# Patient Record
Sex: FEMALE | Race: WHITE | NOT HISPANIC OR LATINO | Employment: OTHER | URBAN - METROPOLITAN AREA
[De-identification: names, ages, dates, MRNs, and addresses within clinical notes are randomized per-mention and may not be internally consistent; named-entity substitution may affect disease eponyms.]

---

## 2019-08-28 ENCOUNTER — OFFICE VISIT (OUTPATIENT)
Dept: UROLOGY | Facility: MEDICAL CENTER | Age: 79
End: 2019-08-28
Payer: MEDICARE

## 2019-08-28 VITALS
DIASTOLIC BLOOD PRESSURE: 80 MMHG | HEART RATE: 85 BPM | SYSTOLIC BLOOD PRESSURE: 130 MMHG | BODY MASS INDEX: 34.16 KG/M2 | HEIGHT: 65 IN | WEIGHT: 205 LBS

## 2019-08-28 DIAGNOSIS — R35.0 FREQUENCY OF URINATION: Primary | ICD-10-CM

## 2019-08-28 LAB — POST-VOID RESIDUAL VOLUME, ML POC: 17 ML

## 2019-08-28 PROCEDURE — 99204 OFFICE O/P NEW MOD 45 MIN: CPT | Performed by: UROLOGY

## 2019-08-28 PROCEDURE — 51798 US URINE CAPACITY MEASURE: CPT | Performed by: UROLOGY

## 2019-08-28 RX ORDER — OLOPATADINE HYDROCHLORIDE 2 MG/ML
SOLUTION/ DROPS OPHTHALMIC
Refills: 3 | COMMUNITY
Start: 2019-06-07

## 2019-08-28 RX ORDER — SIMVASTATIN 10 MG
TABLET ORAL
COMMUNITY

## 2019-08-28 RX ORDER — ALPRAZOLAM 0.5 MG/1
TABLET ORAL
Refills: 2 | COMMUNITY
Start: 2019-08-18

## 2019-08-28 RX ORDER — FLUTICASONE PROPIONATE 50 MCG
SPRAY, SUSPENSION (ML) NASAL
COMMUNITY

## 2019-08-28 RX ORDER — NAPROXEN 375 MG/1
TABLET ORAL
COMMUNITY

## 2019-08-28 RX ORDER — MULTIVITAMIN
1 TABLET ORAL DAILY
COMMUNITY

## 2019-08-28 RX ORDER — BENAZEPRIL/HYDROCHLOROTHIAZIDE 20 MG-25MG
TABLET ORAL
COMMUNITY

## 2019-08-28 RX ORDER — BETAMETHASONE DIPROPIONATE 0.5 MG/G
CREAM TOPICAL
COMMUNITY

## 2019-08-28 RX ORDER — TRAMADOL HYDROCHLORIDE 50 MG/1
TABLET ORAL
COMMUNITY

## 2019-08-28 RX ORDER — VANCOMYCIN HYDROCHLORIDE 125 MG/1
CAPSULE ORAL
COMMUNITY

## 2019-08-28 RX ORDER — CEPHALEXIN 500 MG/1
CAPSULE ORAL
COMMUNITY

## 2019-08-28 RX ORDER — METOPROLOL TARTRATE 50 MG/1
50 TABLET, FILM COATED ORAL 2 TIMES DAILY
COMMUNITY

## 2019-08-28 RX ORDER — AZITHROMYCIN 250 MG/1
TABLET, FILM COATED ORAL
COMMUNITY

## 2019-08-28 RX ORDER — CLINDAMYCIN HYDROCHLORIDE 300 MG/1
CAPSULE ORAL
COMMUNITY

## 2019-08-28 RX ORDER — LANOLIN ALCOHOL/MO/W.PET/CERES
1 CREAM (GRAM) TOPICAL DAILY
COMMUNITY

## 2019-08-28 RX ORDER — LEVOTHYROXINE SODIUM 0.05 MG/1
50 TABLET ORAL DAILY
Refills: 1 | COMMUNITY
Start: 2019-07-21

## 2019-08-28 RX ORDER — IBUPROFEN 600 MG/1
TABLET ORAL
COMMUNITY
Start: 2018-12-22

## 2019-08-28 RX ORDER — PRAVASTATIN SODIUM 20 MG
TABLET ORAL
COMMUNITY

## 2019-08-28 RX ORDER — METOPROLOL SUCCINATE 50 MG/1
50 TABLET, EXTENDED RELEASE ORAL DAILY
Refills: 2 | COMMUNITY
Start: 2019-06-06

## 2019-08-28 RX ORDER — QUINAPRIL HCL AND HYDROCHLOROTHIAZIDE 20; 25 MG/1; MG/1
TABLET ORAL
COMMUNITY

## 2019-08-28 RX ORDER — BETAMETHASONE DIPROPIONATE 0.5 MG/G
CREAM TOPICAL
Refills: 1 | COMMUNITY
Start: 2019-06-05

## 2019-08-28 RX ORDER — LISINOPRIL AND HYDROCHLOROTHIAZIDE 25; 20 MG/1; MG/1
1 TABLET ORAL DAILY
Refills: 3 | COMMUNITY
Start: 2019-08-09

## 2019-08-28 RX ORDER — ROSUVASTATIN CALCIUM 5 MG/1
5 TABLET, COATED ORAL DAILY
Refills: 3 | COMMUNITY
Start: 2019-06-25

## 2019-08-28 RX ORDER — OMEPRAZOLE 20 MG/1
20 CAPSULE, DELAYED RELEASE ORAL 2 TIMES DAILY
Refills: 2 | COMMUNITY
Start: 2019-08-05

## 2019-08-28 RX ORDER — ASPIRIN 81 MG/1
81 TABLET, CHEWABLE ORAL DAILY
COMMUNITY

## 2019-08-28 RX ORDER — BRIMONIDINE TARTRATE/TIMOLOL 0.2%-0.5%
DROPS OPHTHALMIC (EYE)
Refills: 3 | COMMUNITY
Start: 2019-08-20

## 2019-08-28 RX ORDER — MELOXICAM 15 MG/1
15 TABLET ORAL DAILY PRN
Refills: 2 | COMMUNITY
Start: 2019-06-05

## 2019-08-28 NOTE — PATIENT INSTRUCTIONS
48 - 64  Oz of all fluids each day    If the frequency problem continues, call office and make appointment for "CYSTOSCOPY"

## 2019-09-04 NOTE — PROGRESS NOTES
HISTORY:    Recent, over the past couple weeks, development of urgency, frequency, and several nights of nocturia times 5-10  Has settled down over the past few days  Now he nocturia x2 or so  Daytime no urge incontinence, but frequency comes and goes  No dysuria, blood, signs of infection  History of SWL for stones many years ago, no symptoms of that recently  ASSESSMENT / PLAN:    Bladder emptying well now, PVR only 17 mL  No evidence of infection  Unclear cause of recent development of urgency frequency nocturia  However, getting better the past few days  She will observe her symptoms over the next few weeks  If this continues settle down, no treatment needed  If does not settle down, would consider Detrol 2 mg once per day to start, may need cystoscopy  She will let us know if symptoms warrant starting the med  The following portions of the patient's history were reviewed and updated as appropriate: allergies, current medications, past family history, past medical history, past social history, past surgical history and problem list     Review of Systems   All other systems reviewed and are negative  Objective:     Physical Exam   Constitutional: She is oriented to person, place, and time  She appears well-developed and well-nourished  No distress  HENT:   Head: Normocephalic and atraumatic  Eyes: Conjunctivae are normal    Cardiovascular: Normal rate and regular rhythm  Pulmonary/Chest: Effort normal and breath sounds normal  No respiratory distress  She has no wheezes  Abdominal: Soft  Bowel sounds are normal  She exhibits no distension and no mass  There is no tenderness  Neurological: She is alert and oriented to person, place, and time  Skin: Skin is warm and dry  She is not diaphoretic           No results found for: PSA]  No results found for: BUN  No results found for: CREATININE  No components found for: CBC      There is no problem list on file for this patient  Diagnoses and all orders for this visit:    Frequency of urination  -     POCT Measure PVR  -     Urinalysis with microscopic    Other orders  -     aspirin 81 mg chewable tablet; Chew 81 mg daily  -     Multiple Vitamin (MULTIVITAMIN) tablet; Take 1 tablet by mouth daily  -     co-enzyme Q-10 30 MG capsule; Take 30 mg by mouth 3 (three) times a day           Patient ID: Eduard Drummond is a 66 y o  female        Current Outpatient Medications:     ALPRAZolam (XANAX) 0 5 mg tablet, TAKE 1/2-1 TABLET BY MOUTH EVERY 4-6 HOURS AS NEEDED, Disp: , Rfl: 2    aspirin 81 mg chewable tablet, Chew 81 mg daily, Disp: , Rfl:     calcium citrate-vitamin D (CITRACAL+D) 315-200 MG-UNIT per tablet, Take 1 tablet by mouth daily, Disp: , Rfl:     co-enzyme Q-10 30 MG capsule, Take 30 mg by mouth 3 (three) times a day, Disp: , Rfl:     COMBIGAN 0 2-0 5 %, INSTILL 1 DROP TO BOTH EYES TWICE DAILY, Disp: , Rfl: 3    denosumab (PROLIA) 60 mg/mL, Inject 60 mg under the skin, Disp: , Rfl:     glucosamine-chondroitin 500-400 MG tablet, Take 1 tablet by mouth daily, Disp: , Rfl:     levothyroxine 50 mcg tablet, Take 50 mcg by mouth daily, Disp: , Rfl: 1    lisinopril-hydrochlorothiazide (PRINZIDE,ZESTORETIC) 20-25 MG per tablet, Take 1 tablet by mouth daily, Disp: , Rfl: 3    meloxicam (MOBIC) 15 mg tablet, Take 15 mg by mouth daily as needed, Disp: , Rfl: 2    metoprolol succinate (TOPROL-XL) 50 mg 24 hr tablet, Take 50 mg by mouth daily, Disp: , Rfl: 2    Multiple Vitamin (MULTIVITAMIN) tablet, Take 1 tablet by mouth daily, Disp: , Rfl:     olopatadine HCl (PATADAY) 0 2 % opth drops, INSTILL 1 DROP TO BOTH EYES ONCE DAILY AS NEEDED, Disp: , Rfl: 3    omeprazole (PriLOSEC) 20 mg delayed release capsule, Take 20 mg by mouth 2 (two) times a day, Disp: , Rfl: 2    rosuvastatin (CRESTOR) 5 mg tablet, Take 5 mg by mouth daily, Disp: , Rfl: 3    azithromycin (ZITHROMAX) 250 mg tablet, azithromycin 250 mg tablet, Disp: , Rfl:     benazepril-hydrochlorthiazide (LOTENSIN HCT) 20-25 MG per tablet, benazepril 20 mg-hydrochlorothiazide 25 mg tablet, Disp: , Rfl:     betamethasone dipropionate (DIPROSONE) 0 05 % cream, betamethasone dipropionate 0 05 % topical cream, Disp: , Rfl:     betamethasone, augmented, (DIPROLENE-AF) 0 05 % cream, APPLY THIN COAT TO AFFECTED AREA TWICE A DAY IN THE MORNING AND IN THE EVENING, Disp: , Rfl: 1    cephalexin (KEFLEX) 500 mg capsule, cephalexin 500 mg capsule, Disp: , Rfl:     clindamycin (CLEOCIN) 300 MG capsule, clindamycin HCl 300 mg capsule, Disp: , Rfl:     fluticasone (FLONASE) 50 mcg/act nasal spray, fluticasone propionate 50 mcg/actuation nasal spray,suspension, Disp: , Rfl:     ibuprofen (MOTRIN) 600 mg tablet, ibuprofen 600 mg tablet, Disp: , Rfl:     metoprolol tartrate (LOPRESSOR) 50 mg tablet, Take 50 mg by mouth 2 (two) times a day, Disp: , Rfl:     naproxen (NAPROSYN) 375 mg tablet, naproxen 375 mg tablet, Disp: , Rfl:     pravastatin (PRAVACHOL) 20 mg tablet, pravastatin 20 mg tablet, Disp: , Rfl:     quinapril-hydrochlorothiazide (ACCURETIC) 20-25 MG per tablet, quinapril 20 mg-hydrochlorothiazide 25 mg tablet, Disp: , Rfl:     simvastatin (ZOCOR) 10 mg tablet, simvastatin 10 mg tablet, Disp: , Rfl:     traMADol (ULTRAM) 50 mg tablet, tramadol 50 mg tablet, Disp: , Rfl:     vancomycin (VANCOCIN) 125 MG capsule, vancomycin 125 mg capsule, Disp: , Rfl:     Past Medical History:   Diagnosis Date    Kidney stone     Pre-diabetes        Past Surgical History:   Procedure Laterality Date    APPENDECTOMY      FOOT SURGERY      HYSTERECTOMY      KNEE SURGERY  2010       Social History

## 2019-09-06 ENCOUNTER — TELEPHONE (OUTPATIENT)
Dept: UROLOGY | Facility: MEDICAL CENTER | Age: 79
End: 2019-09-06

## 2019-09-06 NOTE — TELEPHONE ENCOUNTER
Spoke to pt  Nocturia is now once as opposed to 8 and is feeling a lot better since her appt 8/28  Pt thought she saw something on UA which said "sarcoma"  I explained to her it states Squamous, which are benign normal cells found in the urinary tract

## 2019-09-06 NOTE — TELEPHONE ENCOUNTER
Patient of Dr Reny Perez seen in the [facility]  Patient is requesting results for her urine culture  Please advise

## 2021-02-09 DIAGNOSIS — Z23 ENCOUNTER FOR IMMUNIZATION: ICD-10-CM

## 2021-02-10 ENCOUNTER — IMMUNIZATIONS (OUTPATIENT)
Dept: FAMILY MEDICINE CLINIC | Facility: HOSPITAL | Age: 81
End: 2021-02-10

## 2021-02-10 DIAGNOSIS — Z23 ENCOUNTER FOR IMMUNIZATION: Primary | ICD-10-CM

## 2021-02-10 PROCEDURE — 0011A SARS-COV-2 / COVID-19 MRNA VACCINE (MODERNA) 100 MCG: CPT

## 2021-02-10 PROCEDURE — 91301 SARS-COV-2 / COVID-19 MRNA VACCINE (MODERNA) 100 MCG: CPT

## 2021-03-08 ENCOUNTER — IMMUNIZATIONS (OUTPATIENT)
Dept: FAMILY MEDICINE CLINIC | Facility: HOSPITAL | Age: 81
End: 2021-03-08

## 2021-03-08 DIAGNOSIS — Z23 ENCOUNTER FOR IMMUNIZATION: Primary | ICD-10-CM

## 2021-03-08 PROCEDURE — 91301 SARS-COV-2 / COVID-19 MRNA VACCINE (MODERNA) 100 MCG: CPT

## 2021-03-08 PROCEDURE — 0012A SARS-COV-2 / COVID-19 MRNA VACCINE (MODERNA) 100 MCG: CPT

## 2021-11-10 PROBLEM — H40.9 GLAUCOMA: Status: ACTIVE | Noted: 2021-08-23

## 2021-11-10 PROBLEM — F41.9 ANXIETY: Status: ACTIVE | Noted: 2020-12-20

## 2021-11-10 PROBLEM — I10 ESSENTIAL HYPERTENSION: Status: ACTIVE | Noted: 2021-08-22

## 2021-11-10 PROBLEM — M15.0 PRIMARY GENERALIZED (OSTEO)ARTHRITIS: Status: ACTIVE | Noted: 2021-11-10

## 2021-11-10 PROBLEM — E03.9 HYPOTHYROIDISM: Status: ACTIVE | Noted: 2021-08-22

## 2021-11-10 PROBLEM — M47.816 LUMBAR SPONDYLOSIS: Status: ACTIVE | Noted: 2021-11-10

## 2021-11-10 PROBLEM — R91.1 SOLITARY PULMONARY NODULE: Status: ACTIVE | Noted: 2021-08-23

## 2021-11-10 PROBLEM — K21.9 GERD (GASTROESOPHAGEAL REFLUX DISEASE): Status: ACTIVE | Noted: 2020-12-20

## 2021-11-10 PROBLEM — E78.5 HYPERLIPIDEMIA: Status: ACTIVE | Noted: 2021-08-22

## 2021-11-10 PROBLEM — M81.0 AGE-RELATED OSTEOPOROSIS WITHOUT CURRENT PATHOLOGICAL FRACTURE: Status: ACTIVE | Noted: 2021-11-10

## 2021-11-10 PROBLEM — M17.12 PRIMARY OSTEOARTHRITIS OF LEFT KNEE: Status: ACTIVE | Noted: 2021-11-10

## 2021-11-10 PROBLEM — Z85.820 HISTORY OF MELANOMA: Status: ACTIVE | Noted: 2021-08-22

## 2022-11-17 PROBLEM — M48.062 SPINAL STENOSIS OF LUMBAR REGION WITH NEUROGENIC CLAUDICATION: Status: ACTIVE | Noted: 2022-11-17

## 2022-12-02 ENCOUNTER — EVALUATION (OUTPATIENT)
Dept: PHYSICAL THERAPY | Facility: CLINIC | Age: 82
End: 2022-12-02

## 2022-12-02 DIAGNOSIS — M48.062 SPINAL STENOSIS OF LUMBAR REGION WITH NEUROGENIC CLAUDICATION: ICD-10-CM

## 2022-12-02 DIAGNOSIS — M17.12 PRIMARY OSTEOARTHRITIS OF LEFT KNEE: ICD-10-CM

## 2022-12-02 NOTE — PROGRESS NOTES
PT Evaluation  Lumbar Spine    Today's date: 2022  Patient name: Bassem Jennings  : 1940  MRN: [id number]  Referring provider: Maria Alejandra Sandoval MD  Dx:   Encounter Diagnosis     ICD-10-CM    1  Primary osteoarthritis of left knee  M17 12 Ambulatory Referral to Physical Therapy      2  Spinal stenosis of lumbar region with neurogenic claudication  M48 062 Ambulatory Referral to Physical Therapy                     Assessment  Assessment details: Bassem Jennings is a 80 y o  female presents with lumbar spinal stenosis with neurogenic claudication  Bassem Jennings has the above listed impairments and will benefit from skilled PT to improve deficits to return to prior level of function  Bassem Jennings was educated on eval findings and plan for management, safe body mechanics with transfers to protect osteoporotic spine  HEP initiated  Lee Bedoya would benefit from skilled services to improve ROM, strength, flexibility, and function, and to decrease pain  Pt also referred to PT for L knee, eval date TBD  Pt will then be treated concurrently for L knee and lumbar spine  Impairments: abnormal gait, abnormal or restricted ROM, activity intolerance, impaired balance, impaired physical strength, lacks appropriate home exercise program, pain with function, poor posture  and poor body mechanics  Understanding of Dx/Px/POC: good   Prognosis: good    Goals  2 wks  - No pain > 3/10  - Increase strength 1/3 grade  - Increase lumbar ROM at least 10 deg where applicable  - Increase standing tolerance to at least 10 min    4-6 wks  - Pain 0-2/10  - Strength 5/5  - Lumbar ROM WFL and painfree  - Independent with HEP for self management  - Functional Status Measure at least 62 (score 57 at eval)  - Return to functional tolerance for standing, at least 30 min  - Sit to stand x 5 in < 20 sec        Plan  Patient would benefit from: skilled physical therapy  Planned modality interventions: thermotherapy: hydrocollator packs  Planned therapy interventions: abdominal trunk stabilization, joint mobilization, manual therapy, neuromuscular re-education, patient education, postural training, balance, body mechanics training, strengthening, stretching, therapeutic activities, therapeutic exercise, flexibility and home exercise program  Frequency: 2x week  Duration in visits: 8  Duration in weeks: 4  Treatment plan discussed with: patient        Subjective Evaluation    History of Present Illness  Mechanism of injury: Pt reporting 6 months ago, insidious onset central low back pain, BLE radiation to ankles with prolonged standing "like in a line at the grocery store "  Pt denies having bowel/bladder changes, saddle paresthesias, unexplained weight loss  Functional limitations: onset pain with standing "almost immediately"  No PSH lumbar spine  Also referred to PT for L knee OA, to schedule eval, date TBD  Agrees that gait is "slower and clumsy" over the past 6 months as well  Pain  Current pain ratin  At best pain ratin  At worst pain ratin  Location: Midline LBP, BLE radiation to ankles  Quality: radiating and dull ache  Aggravating factors: standing    Social Support  Steps to enter house: yes  1  Stairs in house: no   Lives in: [facility]  Lives with: adult children (In-law apt attached to adult children)    Employment status: not working    Diagnostic Tests  No diagnostic tests performed  Treatments  No previous or current treatments  Patient Goals  Patient goals for therapy: decreased pain, independence with ADLs/IADLs, increased motion, increased strength and return to sport/leisure activities  Patient goal: Walking regimen        Objective    Gait: no AD, steady non-antalgic pattern  Bed mobility/transfers: sit to stand with BUE assist, increased trunk flexion at hips  Rolling in bed (I) but with L hamstring cramping  Supine/sit with transfer into long sit flexing lumbar spine    Verbal cues for supine to sit via sidelying to protect osteoporotic spine  Posture: B genu valgum  Forward head and shoulders, normal thoracic kyphosis, decreased lumbar lordosis  OH squat: Fair balance, L knee pain, depth 50%    Lumbar ROM: flexion deferred due to PMH osteoporosis  Ext 5 deg, no effect (NE)  LVIE NE   SB B 20 deg with low back "pressure"  B rotation 64 deg with low back "pressure"  MMT: B glute medius 4/5  Glute max: R 3-/5, L 2+/5  L hamstring cramping with L glute max MMT  Transverse abdominis cx Trace via palpation  Joint mobilizations: B PA, UPA L1-L5 with no effect on pain, decreased joint mobility throughout  Special tests: B stand march test (-)  No LLD in supine  Manual lumbar traction pain free at low back, but with B calf pain with pressure from PT pulling  B SLR (-)  Flexibility: Hip flexors: R moderately tight, L severely tight  B gastroc moderately tight  B hamstrings mildly tight  B Adrian's (-)           Precautions: PMH osteoporosis, melanoma, HTN    LUMBAR SPINE  Manuals 12/2 /22            Man str B HF nv                                                   Neuro Re-Ed             TA cx 10"x10                                                                                          Ther Ex             Bridge 10"x10            Clamshell B 10"x10            H/L HS str             SKTC w towel B             Wall gastroc str             Nu Step for strength/endur-ance             Stand HF str                          Ther Activity             Sit to stand             T ball squat             FSU                                       Modalities

## 2022-12-02 NOTE — LETTER
2022    Na James MD  [facility]    Patient: Steve Lei   YOB: 1940   Date of Visit: 2022     Encounter Diagnosis     ICD-10-CM    1  Primary osteoarthritis of left knee  M17 12 Ambulatory Referral to Physical Therapy      2  Spinal stenosis of lumbar region with neurogenic claudication  M48 062 Ambulatory Referral to Physical Therapy          Dear Dr Giovani Ribera: Thank you for your recent referral of Steve Lei  Please review the attached evaluation summary from Cherrie's recent visit  Please verify that you agree with the plan of care by signing the attached order  If you have any questions or concerns, please do not hesitate to call  I sincerely appreciate the opportunity to share in the care of one of your patients and hope to have another opportunity to work with you in the near future  Sincerely,    Abdulaziz Ortiz, PT      Referring Provider:      I certify that I have read the below Plan of Care and certify the need for these services furnished under this plan of treatment while under my care  [address] [id number]  [address]          PT Evaluation  Lumbar Spine    Today's date: 2022  Patient name: Steve Lei  : 1940  MRN: [id number]  Referring provider: Claudia Lowry MD  Dx:   Encounter Diagnosis     ICD-10-CM    1  Primary osteoarthritis of left knee  M17 12 Ambulatory Referral to Physical Therapy      2  Spinal stenosis of lumbar region with neurogenic claudication  M48 062 Ambulatory Referral to Physical Therapy                     Assessment  Assessment details: Steve Lei is a 80 y o  female presents with lumbar spinal stenosis with neurogenic claudication  Steve Lei has the above listed impairments and will benefit from skilled PT to improve deficits to return to prior level of function    Steve Lei was educated on eval findings and plan for management, safe body mechanics with transfers to protect osteoporotic spine  HEP initiated  [employer] would benefit from skilled services to improve ROM, strength, flexibility, and function, and to decrease pain  Pt also referred to PT for L knee, eval date TBD  Pt will then be treated concurrently for L knee and lumbar spine  Impairments: abnormal gait, abnormal or restricted ROM, activity intolerance, impaired balance, impaired physical strength, lacks appropriate home exercise program, pain with function, poor posture  and poor body mechanics  Understanding of Dx/Px/POC: good   Prognosis: good    Goals  2 wks  - No pain > 3/10  - Increase strength 1/3 grade  - Increase lumbar ROM at least 10 deg where applicable  - Increase standing tolerance to at least 10 min    4-6 wks  - Pain 0-2/10  - Strength 5/5  - Lumbar ROM WFL and painfree  - Independent with HEP for self management  - Functional Status Measure at least 62 (score 57 at eval)  - Return to functional tolerance for standing, at least 30 min  - Sit to stand x 5 in < 20 sec        Plan  Patient would benefit from: skilled physical therapy  Planned modality interventions: thermotherapy: hydrocollator packs  Planned therapy interventions: abdominal trunk stabilization, joint mobilization, manual therapy, neuromuscular re-education, patient education, postural training, balance, body mechanics training, strengthening, stretching, therapeutic activities, therapeutic exercise, flexibility and home exercise program  Frequency: 2x week  Duration in visits: 8  Duration in weeks: 4  Treatment plan discussed with: patient        Subjective Evaluation    History of Present Illness  Mechanism of injury: Pt reporting 6 months ago, insidious onset central low back pain, BLE radiation to ankles with prolonged standing "like in a line at the grocery store "  Pt denies having bowel/bladder changes, saddle paresthesias, unexplained weight loss   Functional limitations: onset pain with standing "almost immediately"  No PSH lumbar spine  Also referred to PT for L knee OA, to schedule eval, date TBD  Agrees that gait is "slower and clumsy" over the past 6 months as well  Pain  Current pain ratin  At best pain ratin  At worst pain ratin  Location: Midline LBP, BLE radiation to ankles  Quality: radiating and dull ache  Aggravating factors: standing    Social Support  Steps to enter house: yes  1  Stairs in house: no   Lives in: [facility]  Lives with: adult children (In-law apt attached to adult children)    Employment status: not working    Diagnostic Tests  No diagnostic tests performed  Treatments  No previous or current treatments  Patient Goals  Patient goals for therapy: decreased pain, independence with ADLs/IADLs, increased motion, increased strength and return to sport/leisure activities  Patient goal: Walking regimen        Objective    Gait: no AD, steady non-antalgic pattern  Bed mobility/transfers: sit to stand with BUE assist, increased trunk flexion at hips  Rolling in bed (I) but with L hamstring cramping  Supine/sit with transfer into long sit flexing lumbar spine  Verbal cues for supine to sit via sidelying to protect osteoporotic spine  Posture: B genu valgum  Forward head and shoulders, normal thoracic kyphosis, decreased lumbar lordosis  OH squat: Fair balance, L knee pain, depth 50%    Lumbar ROM: flexion deferred due to PMH osteoporosis  Ext 5 deg, no effect (NE)  LIVE NE   SB B 20 deg with low back "pressure"  B rotation 64 deg with low back "pressure"  MMT: B glute medius 4/5  Glute max: R 3-/5, L 2+/5  L hamstring cramping with L glute max MMT  Transverse abdominis cx Trace via palpation  Joint mobilizations: B PA, UPA L1-L5 with no effect on pain, decreased joint mobility throughout  Special tests: B stand march test (-)  No LLD in supine    Manual lumbar traction pain free at low back, but with B calf pain with pressure from PT pulling  B SLR (-)  Flexibility: Hip flexors: R moderately tight, L severely tight  B gastroc moderately tight  B hamstrings mildly tight  B Adrian's (-)          Precautions: PMH osteoporosis, melanoma, HTN    LUMBAR SPINE  Manuals 12/2 /22            Man str B HF nv                                                   Neuro Re-Ed             TA cx 10"x10                                                                                          Ther Ex             Bridge 10"x10            Clamshell B 10"x10            H/L HS str             SKTC w towel B             Wall gastroc str             Nu Step for strength/endur-ance             Stand HF str                          Ther Activity             Sit to stand             T ball squat             FSU                                       Modalities

## 2022-12-06 ENCOUNTER — OFFICE VISIT (OUTPATIENT)
Dept: PHYSICAL THERAPY | Facility: CLINIC | Age: 82
End: 2022-12-06

## 2022-12-06 DIAGNOSIS — M48.062 SPINAL STENOSIS OF LUMBAR REGION WITH NEUROGENIC CLAUDICATION: ICD-10-CM

## 2022-12-06 DIAGNOSIS — M17.12 PRIMARY OSTEOARTHRITIS OF LEFT KNEE: Primary | ICD-10-CM

## 2022-12-06 NOTE — PROGRESS NOTES
Daily Note     Today's date: 2022  Patient name: Fredy Camarillo  : 1940  MRN: [id number]  Referring provider: Fariba Elliott MD  Dx:   Encounter Diagnosis     ICD-10-CM    1  Primary osteoarthritis of left knee  M17 12       2  Spinal stenosis of lumbar region with neurogenic claudication  M48 062                      Subjective: LBP 0/10, has difficulty with clamshells for HEP  Received injection L knee yesterday, did not do HEP just iced knee  Objective: See treatment diary below      Assessment: Tolerated treatment well  Patient would benefit from continued PT  Limited closed chain exercise due to L knee injection yesterday  Plan: Continue per plan of care        Precautions: PMH osteoporosis, melanoma, HTN    LUMBAR SPINE  Manuals            Man str B HF nv 20"x5                                                  Neuro Re-Ed             TA cx 10"x10 x20                                                                                         Ther Ex             Bridge 10"x10 x15           Clamshell B 10"x10 x15           H/L HS str  20"x5           SKTC w towel B  20"x5           Wall gastroc str  20"x5           Nu Step for strength/endur-ance             Stand HF str                          Ther Activity             Sit to stand  nv           T ball squat  x10           FSU                                       Modalities

## 2022-12-08 ENCOUNTER — APPOINTMENT (OUTPATIENT)
Dept: PHYSICAL THERAPY | Facility: CLINIC | Age: 82
End: 2022-12-08

## 2022-12-12 ENCOUNTER — OFFICE VISIT (OUTPATIENT)
Dept: PHYSICAL THERAPY | Facility: CLINIC | Age: 82
End: 2022-12-12

## 2022-12-12 DIAGNOSIS — M48.062 SPINAL STENOSIS OF LUMBAR REGION WITH NEUROGENIC CLAUDICATION: ICD-10-CM

## 2022-12-12 DIAGNOSIS — M17.12 PRIMARY OSTEOARTHRITIS OF LEFT KNEE: Primary | ICD-10-CM

## 2022-12-12 NOTE — PROGRESS NOTES
Daily Note     Today's date: 2022  Patient name: Rinku Santos  : 1940  MRN: [id number]  Referring provider: Dolphus Aase, MD  Dx:   Encounter Diagnosis     ICD-10-CM    1  Primary osteoarthritis of left knee  M17 12       2  Spinal stenosis of lumbar region with neurogenic claudication  M48 062                      Subjective: "It's a combination of my back and my knee, but the knee's been better, I got that injection " Pt c/o's of LBP/L knee pain -5/10 at arrival  Reports compliance with HEP but struggles with clamshells  Reports still getting cramps in her hip muscles, attesting this to being dehydrated  Objective: See treatment diary below      Assessment: Tolerated treatment well  Patient would benefit from continued PT For improved strength, flexibility and overall function  T-ball squats painful, L knee  Verbal cueing needed for eccentric control lowering back down to chair with STS  Plan: Continue per plan of care  Precautions: PMH osteoporosis, melanoma, HTN    LUMBAR SPINE  Manuals           Man str B HF nv 20"x5 x5                                                 Neuro Re-Ed             TA cx 10"x10 x20 x30 HEP                                                                                       Ther Ex             Bridge 10"x10 x15 x20          Clamshell B 10"x10 x15 x20          H/L HS str  20"x5 x5 with towel          SKTC w towel B  20"x5 x5           Wall gastroc str  20"x5 x5          Nu Step for strength/endur-ance   5' L1          Stand HF str   20''x5                       Ther Activity             Sit to stand  nv x10          T ball squat  x10 X12, p!           FSU                                       Modalities

## 2022-12-14 ENCOUNTER — OFFICE VISIT (OUTPATIENT)
Dept: PHYSICAL THERAPY | Facility: CLINIC | Age: 82
End: 2022-12-14

## 2022-12-14 DIAGNOSIS — M17.12 PRIMARY OSTEOARTHRITIS OF LEFT KNEE: Primary | ICD-10-CM

## 2022-12-14 DIAGNOSIS — M48.062 SPINAL STENOSIS OF LUMBAR REGION WITH NEUROGENIC CLAUDICATION: ICD-10-CM

## 2022-12-14 NOTE — PROGRESS NOTES
Daily Note     Today's date: 2022  Patient name: Black Szymanski  : 1940  MRN: [id number]  Referring provider: Sonia Condon MD  Dx:   Encounter Diagnosis     ICD-10-CM    1  Primary osteoarthritis of left knee  M17 12       2  Spinal stenosis of lumbar region with neurogenic claudication  M48 062           Start Time: 1046          Subjective: LBP 0/10, notes L knee pain, "I need a new left knee "      Objective: See treatment diary below      Assessment: Tolerated treatment well  Patient would benefit from continued PT   L knee pain limiting closed chain exercise tolerance  Plan: Continue per plan of care  Precautions: PMH osteoporosis, melanoma, HTN    LUMBAR SPINE  Manuals          Man str B HF nv 20"x5 x5 x5                                                Neuro Re-Ed             TA cx 10"x10 x20 x30 HEP                                                                                       Ther Ex             Bridge 10"x10 x15 x20 x25         Clamshell B 10"x10 x15 x20 x25         H/L HS str  20"x5 x5 with towel x5         SKTC w towel B  20"x5 x5  x5         Wall gastroc str  20"x5 x5 x5         Nu Step for strength/endur-ance   5' L1 7' L5         Stand HF str   20''x5 x5                      Ther Activity             Sit to stand  nv x10 x10         T ball squat  x10 X12, p!           FSU                                       Modalities

## 2022-12-20 ENCOUNTER — OFFICE VISIT (OUTPATIENT)
Dept: PHYSICAL THERAPY | Facility: CLINIC | Age: 82
End: 2022-12-20

## 2022-12-20 DIAGNOSIS — M17.12 PRIMARY OSTEOARTHRITIS OF LEFT KNEE: ICD-10-CM

## 2022-12-20 DIAGNOSIS — M48.062 SPINAL STENOSIS OF LUMBAR REGION WITH NEUROGENIC CLAUDICATION: Primary | ICD-10-CM

## 2022-12-20 NOTE — PROGRESS NOTES
Daily Note     Today's date: 2022  Patient name: Annia Meeks  : 1940  MRN: [id number]  Referring provider: Sandro Ambriz MD  Dx:   Encounter Diagnosis     ICD-10-CM    1  Spinal stenosis of lumbar region with neurogenic claudication  M48 062           Start Time: 948          Subjective: LBP 0/10, "to be honest my left knee bothers me more than my back "      Objective: See treatment diary below      Assessment: Tolerated treatment well  Patient would benefit from continued PT      Plan: Continue per plan of care  Pt to schedule L knee eval      Precautions: PMH osteoporosis, melanoma, HTN    LUMBAR SPINE  Manuals         Man str B HF nv 20"x5 x5 x5 x5                                               Neuro Re-Ed             TA cx 10"x10 x20 x30 HEP                                                                                       Ther Ex             Bridge 10"x10 x15 x20 x25 x30        Clamshell B 10"x10 x15 x20 x25 x30        H/L HS str  20"x5 x5 with towel x5 x5        SKTC w towel B  20"x5 x5  x5 x5        Wall gastroc str  20"x5 x5 x5 x5        Nu Step for strength/endur-ance   5' L1 7' L5 7'        Stand HF str   20''x5 x5 x5                     Ther Activity             Sit to stand  nv x10 x10 x10        T ball squat  x10 X12, p!           FSU                                       Modalities

## 2022-12-22 ENCOUNTER — APPOINTMENT (OUTPATIENT)
Dept: PHYSICAL THERAPY | Facility: CLINIC | Age: 82
End: 2022-12-22

## 2022-12-27 ENCOUNTER — APPOINTMENT (OUTPATIENT)
Dept: PHYSICAL THERAPY | Facility: CLINIC | Age: 82
End: 2022-12-27

## 2022-12-28 ENCOUNTER — OFFICE VISIT (OUTPATIENT)
Dept: PHYSICAL THERAPY | Facility: CLINIC | Age: 82
End: 2022-12-28

## 2022-12-28 DIAGNOSIS — M48.062 SPINAL STENOSIS OF LUMBAR REGION WITH NEUROGENIC CLAUDICATION: Primary | ICD-10-CM

## 2022-12-28 DIAGNOSIS — M17.12 PRIMARY OSTEOARTHRITIS OF LEFT KNEE: ICD-10-CM

## 2022-12-28 NOTE — PROGRESS NOTES
Daily Note     Today's date: 2022  Patient name: Rene Sandoval  : 1940  MRN: [id number]  Referring provider: David Mena MD  Dx:   Encounter Diagnosis     ICD-10-CM    1  Spinal stenosis of lumbar region with neurogenic claudication  M48 062       2  Primary osteoarthritis of left knee  M17 12                      Subjective: LBP currently 0/10, notes intermittent LLE cramping especially with prolonged standing, "it can be the whole leg"  Objective: See treatment diary below      Assessment: Tolerated treatment well  Patient would benefit from continued PT      Plan: Continue per plan of care  Precautions: PMH osteoporosis, melanoma, HTN    LUMBAR SPINE  Manuals        Man str B HF nv 20"x5 x5 x5 x5 x5                                              Neuro Re-Ed             TA cx 10"x10 x20 x30 HEP                                                                                       Ther Ex             Bridge 10"x10 x15 x20 x25 x30 x30       Clamshell B 10"x10 x15 x20 x25 x30 x30       H/L HS str  20"x5 x5 with towel x5 x5 x5       SKTC w towel B  20"x5 x5  x5 x5 x5       Wall gastroc str  20"x5 x5 x5 x5 x5       Nu Step for strength/endur-ance   5' L1 7' L5 7' 7'       Stand HF str   20''x5 x5 x5 x5                    Ther Activity             Sit to stand  nv x10 x10 x10 x10       T ball squat  x10 X12, p!           FSU                                       Modalities

## 2022-12-29 ENCOUNTER — APPOINTMENT (OUTPATIENT)
Dept: PHYSICAL THERAPY | Facility: CLINIC | Age: 82
End: 2022-12-29

## 2022-12-30 ENCOUNTER — OFFICE VISIT (OUTPATIENT)
Dept: PHYSICAL THERAPY | Facility: CLINIC | Age: 82
End: 2022-12-30

## 2022-12-30 DIAGNOSIS — M48.062 SPINAL STENOSIS OF LUMBAR REGION WITH NEUROGENIC CLAUDICATION: Primary | ICD-10-CM

## 2022-12-30 DIAGNOSIS — M17.12 PRIMARY OSTEOARTHRITIS OF LEFT KNEE: ICD-10-CM

## 2022-12-30 NOTE — PROGRESS NOTES
Daily Note     Today's date: 2022  Patient name: Romayne Furry  : 1940  MRN: [id number]  Referring provider: Claudia Cameron MD  Dx:   Encounter Diagnosis     ICD-10-CM    1  Spinal stenosis of lumbar region with neurogenic claudication  M48 062       2  Primary osteoarthritis of left knee  M17 12                      Subjective: "It's just my left knee bothering me "  LBP 0/10  Objective: See treatment diary below      Assessment: Tolerated treatment well  Patient would benefit from continued PT      Plan: Continue per plan of care  Precautions: PMH osteoporosis, melanoma, HTN    LUMBAR SPINE  Manuals       Man str B HF nv 20"x5 x5 x5 x5 x5 x5                                             Neuro Re-Ed             TA cx 10"x10 x20 x30 HEP                                                                                       Ther Ex             Bridge 10"x10 x15 x20 x25 x30 x30 x30      Clamshell B 10"x10 x15 x20 x25 x30 x30 x30      H/L HS str  20"x5 x5 with towel x5 x5 x5 x5      SKTC w towel B  20"x5 x5  x5 x5 x5 x5      Wall gastroc str  20"x5 x5 x5 x5 x5 x5      Nu Step for strength/endur-ance   5' L1 7' L5 7' 7' 10'      Stand HF str   20''x5 x5 x5 x5 x5                   Ther Activity             Sit to stand  nv x10 x10 x10 x10 x10      T ball squat  x10 X12, p!           FSU                                       Modalities

## 2023-01-04 ENCOUNTER — EVALUATION (OUTPATIENT)
Dept: PHYSICAL THERAPY | Facility: CLINIC | Age: 83
End: 2023-01-04

## 2023-01-04 DIAGNOSIS — M17.12 PRIMARY OSTEOARTHRITIS OF LEFT KNEE: Primary | ICD-10-CM

## 2023-01-04 NOTE — PROGRESS NOTES
PT Evaluation     Today's date: 2023  Patient name: Bhupendra Hurst  : 1940  MRN: [id number]  Referring provider: Levar Barragan MD  Dx:   Encounter Diagnosis     ICD-10-CM    1  Primary osteoarthritis of left knee  M17 12           Start Time: 1027          Assessment  Assessment details: Bhupendra Hurst is a 80 y o  female presents with dx L knee OA  Bhupendra Hurst has the above listed impairments and will benefit from skilled PT to improve deficits to return to prior level of function  Bhupendra Hurst was educated on eval findings and plan for management  HEP initiated  Meeta Mustafa would benefit from skilled services to improve ROM, strength, flexibility, and function, and to decrease pain  Claude Milagro is currently in PT here for lumbar spinal stenosis as well  Will treat both lumbar spine and L knee moving forward  Impairments: abnormal gait, abnormal or restricted ROM, activity intolerance, impaired balance, impaired physical strength, lacks appropriate home exercise program, pain with function and poor posture   Understanding of Dx/Px/POC: good   Prognosis: good    Goals  Impairment Goals  - Pt I with initial HEP in 1-2 visits  - Improve ROM equal to [facility] in 4-6 weeks  - Increase strength to 5/5 in all affected areas in 4-6 weeks    Functional Goals  - Increase Functional Status Measure to: 61 in 4-6 weeks (score 58 at eval)  - Patient will be independent with comprehensive HEP in 4-6 weeks  - Stair climbing is improved to reciprocal pattern with rail in 4-6 weeks  - LLE dynamic strength and balance at least 90% of RLE via single leg step test, in 4-6 wks        Plan  Patient would benefit from: skilled physical therapy  Planned modality interventions: cryotherapy  Planned therapy interventions: manual therapy, neuromuscular re-education, patient education, postural training, balance, strengthening, stretching, therapeutic activities, therapeutic exercise, flexibility and home exercise program  Frequency: 2x week  Duration in visits: 8  Duration in weeks: 4  Treatment plan discussed with: patient        Subjective Evaluation    History of Present Illness  Mechanism of injury: Pt reporting "years" of L knee pain, insidious onset  PSH R TKA, L knee is her organic knee  Dx with L knee OA  Has received multiple injections L knee "every 6 months "  Functional limitations L knee step-to steps with rail required  Currently in PT here for lumbar spinal stenosis  Pain  Current pain ratin  At best pain ratin  At worst pain ratin  Location: L knee generalized  Quality: sharp  Relieving factors: ice and medications  Aggravating factors: stair climbing and standing    Social Support  Exterior steps/ramp assessed: See low back eval       Diagnostic Tests  No diagnostic tests performed  Abnormal x-ray: "Not that I remember"        Objective    Flowsheet Rows    Flowsheet Row Most Recent Value   PT/OT G-Codes    Current Score 58   Projected Score 61          Gait: no AD, flat footed pattern B, non-antalgic  Steady pattern  Observation: L knee with moderate edema, genu valgum, no ecchymosis or erythema  OH squat: increased trunk flexion at hips, Fair balance, depth 25%  Knee A/PROM:  R: 0-7-122 deg  L: 0-8-120 deg    MMT: quads: R 5/5, L 4/5 with knee pain  B hamstrings 5/5  Glute medius: B 4-/5  Glute max: R 3/5, L 3-/5    Flexibility: B gastroc, hamstrings, hip flexors moderately tight  B Adrian's (-)    Special tests: R knee deferred, PSH TKA  L Lachman, Reverse Lachman, varus and valgus stress, Jasmine (-)  Patellar mobility: WFL for medial/lateral, superior/inferior glides         Precautions:  PMH osteoporosis, melanoma, HTN    L knee  Manuals 23                                                                Neuro Re-Ed             QS 10"x15                                                                                          Ther Ex             Heel slide w strap 10"x15            Strap gastroc str 20"x5            SLR 2x10            SAQ             LAQ             TKE w t band             Leg press L                          Ther Activity                                       Gait Training                                       Modalities             Ice                                Precautions: PMH osteoporosis, melanoma, HTN    LUMBAR SPINE  Manuals 12/2 /22 12/6 12/12 12/14 12/20 12/28 12/30      Man str B HF nv 20"x5 x5 x5 x5 x5 x5                                             Neuro Re-Ed             TA cx 10"x10 x20 x30 HEP                                                                                       Ther Ex             Bridge 10"x10 x15 x20 x25 x30 x30 x30      Clamshell B 10"x10 x15 x20 x25 x30 x30 x30      H/L HS str  20"x5 x5 with towel x5 x5 x5 x5      SKTC w towel B  20"x5 x5  x5 x5 x5 x5      Wall gastroc str  20"x5 x5 x5 x5 x5 x5      Nu Step for strength/endur-ance   5' L1 7' L5 7' 7' 10'      Stand HF str   20''x5 x5 x5 x5 x5                   Ther Activity             Sit to stand  nv x10 x10 x10 x10 x10      T ball squat  x10 X12, p!           FSU                                       Modalities

## 2023-01-06 ENCOUNTER — OFFICE VISIT (OUTPATIENT)
Dept: PHYSICAL THERAPY | Facility: CLINIC | Age: 83
End: 2023-01-06

## 2023-01-06 DIAGNOSIS — M17.12 PRIMARY OSTEOARTHRITIS OF LEFT KNEE: Primary | ICD-10-CM

## 2023-01-06 DIAGNOSIS — M48.062 SPINAL STENOSIS OF LUMBAR REGION WITH NEUROGENIC CLAUDICATION: ICD-10-CM

## 2023-01-06 NOTE — PROGRESS NOTES
Daily Note     Today's date: 2023  Patient name: Clint Malhotra  : 1940  MRN: [id number]  Referring provider: Cody Bruno MD  Dx:   Encounter Diagnosis     ICD-10-CM    1  Primary osteoarthritis of left knee  M17 12       2  Spinal stenosis of lumbar region with neurogenic claudication  M48 062                      Subjective: patient reports she was sore after last visit, but other than that she is feeling okay  Objective: See treatment diary below      Assessment: Tolerated treatment well   She required cuing throughout the session for proper technique with good carryover after cuing  She had no reports of increased pain throughout the session  Short rest breaks required  She would benefit from continued PT  Plan: Continue per plan of care        Precautions:  PMH osteoporosis, melanoma, HTN    L knee  Manuals 23                                                               Neuro Re-Ed             QS 10"x15 x15                                                                                         Ther Ex             Heel slide w strap 10"x15 x15           Strap gastroc str 20"x5 x5           SLR 2x10 2x10           SAQ  x10           LAQ             TKE w t band             Leg press L                          Ther Activity                                       Gait Training                                       Modalities             Ice                              LUMBAR SPINE  Manuals      Man str B HF nv 20"x5 x5 x5 x5 x5 x5 nv                                            Neuro Re-Ed             TA cx 10"x10 x20 x30 HEP                                                                                       Ther Ex             Bridge 10"x10 x15 x20 x25 x30 x30 x30 x30     Clamshell B 10"x10 x15 x20 x25 x30 x30 x30 x30     H/L HS str  20"x5 x5 with towel x5 x5 x5 x5 x5     SKTC w towel B  20"x5 x5  x5 x5 x5 x5 x5     Wall gastroc str 20"x5 x5 x5 x5 x5 x5 x5     Nu Step for strength/endur-ance   5' L1 7' L5 7' 7' 10' 10'     Stand HF str   20''x5 x5 x5 x5 x5 x5                  Ther Activity             Sit to stand  nv x10 x10 x10 x10 x10 x10     T ball squat  x10 X12, p!           FSU                                       Modalities

## 2023-01-09 ENCOUNTER — OFFICE VISIT (OUTPATIENT)
Dept: PHYSICAL THERAPY | Facility: CLINIC | Age: 83
End: 2023-01-09

## 2023-01-09 DIAGNOSIS — M48.062 SPINAL STENOSIS OF LUMBAR REGION WITH NEUROGENIC CLAUDICATION: ICD-10-CM

## 2023-01-09 DIAGNOSIS — M17.12 PRIMARY OSTEOARTHRITIS OF LEFT KNEE: Primary | ICD-10-CM

## 2023-01-09 NOTE — PROGRESS NOTES
Daily Note     Today's date: 2023  Patient name: Suzanna Balderas  : 1940  MRN: [id number]  Referring provider: Barbra Veliz MD  Dx:   Encounter Diagnosis     ICD-10-CM    1  Primary osteoarthritis of left knee  M17 12       2  Spinal stenosis of lumbar region with neurogenic claudication  M48 062                      Subjective: "It's my knee really, my back's not as bad  I had trouble sleeping last night, I don't know why " Pt c/o's of L knee pain -6/10, LBP /10 at arrival        Objective: See treatment diary below      Assessment: Tolerated treatment well  Patient would benefit from continued PT For improved strength, flexibility and overall function  Plan: Continue per plan of care        Precautions:  PMH osteoporosis, melanoma, HTN    L knee  Manuals 23                                                              Neuro Re-Ed             QS 10"x15 x15 x20                                                                                        Ther Ex             Heel slide w strap 10"x15 x15 x20          Strap gastroc str 20"x5 x5 Wall, see below          SLR 2x10 2x10 3x10          SAQ  x10 2x10          LAQ             TKE w t band   Blk 2x10          Leg press L                          Ther Activity                                       Gait Training                                       Modalities             Ice                              LUMBAR SPINE  Manuals     Man str B HF nv 20"x5 x5 x5 x5 x5 x5 nv x5                                           Neuro Re-Ed             TA cx 10"x10 x20 x30 HEP                                                                                       Ther Ex             Bridge 10"x10 x15 x20 x25 x30 x30 x30 x30 x30    Clamshell B 10"x10 x15 x20 x25 x30 x30 x30 x30 x30    H/L HS str  20"x5 x5 with towel x5 x5 x5 x5 x5 x5    SKTC w towel B  20"x5 x5  x5 x5 x5 x5 x5 x5    Wall gastroc str  20"x5 x5 x5 x5 x5 x5 x5 x5    Nu Step for strength/endur-ance   5' L1 7' L5 7' 7' 10' 10' 10'    Stand HF str   20''x5 x5 x5 x5 x5 x5 x5                 Ther Activity             Sit to stand  nv x10 x10 x10 x10 x10 x10 x10    T ball squat  x10 X12, p!           FSU                                       Modalities

## 2023-01-11 ENCOUNTER — EVALUATION (OUTPATIENT)
Dept: PHYSICAL THERAPY | Facility: CLINIC | Age: 83
End: 2023-01-11

## 2023-01-11 DIAGNOSIS — M48.062 SPINAL STENOSIS OF LUMBAR REGION WITH NEUROGENIC CLAUDICATION: ICD-10-CM

## 2023-01-11 DIAGNOSIS — M17.12 PRIMARY OSTEOARTHRITIS OF LEFT KNEE: Primary | ICD-10-CM

## 2023-01-11 NOTE — PROGRESS NOTES
PT DISCHARGE - LBP    Today's date: 2023  Patient name: Reece Osorio  : 1940  MRN: [id number]  Referring provider: Trinidad Gavin MD  Dx:   Encounter Diagnosis     ICD-10-CM    1  Primary osteoarthritis of left knee  M17 12       2  Spinal stenosis of lumbar region with neurogenic claudication  M48 062                      Subjective: pt reports LBP 0/10, "I think we can wrap it up for the back  It's really my left knee that's been bothering me "  Notes standing tolerance 15-20 min for LBP  Objective: See treatment diary below    RE-EVALUATION: LBP    Pain: 0-4/10, B buttocks, LBP (0-6/10 LBP, BLE radiation to ankles at eval) 22)  FOTO functional score 56, projected score 62  Score at eval 57  Higher score = higher function  Sit to stand x5: 17 84 sec, no UE assist (sit to stand with BUE assist for 1 rep at eval)    Lumbar ROM: flexion not tested due to osteoporosis  Ext 20 deg (5 deg at eval)  B SB 30 deg (20 deg with low back pressure at eval)  R rotation 73 deg, L rotation 75 deg (B rotation 64 deg with low back pressure at eval)  Lumbar ROM painfree today  MMT: B glute medius, glute max 5/5 (2+ to 4/5 at eval)  Assessment: Tolerated treatment well  Patient exhibited good technique with therapeutic exercises     Reece Osorio has been compliant with attending PT and home exercise program since initial eval   Lalo Guillen  has made improvements in objective data since initial evalulation  Patient reports having improved level of function  It was mutually agreed to Discharge to home exercise program at this time  Faustina Yates attended 11 visits, missed 1  Pt D/C to HEP for LBP  Will continue PT for L knee      Goals  2 wks  - No pain > 3/10 - not met  - Increase strength 1/3 grade - met  - Increase lumbar ROM at least 10 deg where applicable - met  - Increase standing tolerance to at least 10 min - met    4-6 wks  - Pain 0-2/10 - not met  - Strength 5/5 - met  - Lumbar ROM [facility] and painfree - met  - Independent with HEP for self management - met  - Functional Status Measure at least 62 (score 57 at eval) - not met  - Return to functional tolerance for standing, at least 30 min - not met  - Sit to stand x 5 in < 20 sec  - met      Plan: D/C to HEP for LBP  Continue with POC for L knee  Precautions:  PMH osteoporosis, melanoma, HTN    L knee  Manuals 1/4/23 1/6 1/9 1/11                                                             Neuro Re-Ed             QS 10"x15 x15 x20 x30                                                                                       Ther Ex             Heel slide w strap 10"x15 x15 x20 x30         Strap gastroc str 20"x5 x5 Wall, see below x5         SLR 2x10 2x10 3x10 3x10         SAQ  x10 2x10 3x10         LAQ    2x10         TKE w t band   Blk 2x10 3x10         Leg press L                          Ther Activity                                       Gait Training                                       Modalities             Ice                              LUMBAR SPINE  Manuals 12/2 /22 12/6 12/12 12/14 12/20 12/28 12/30 1/6 1/9 1/11   Man str B HF nv 20"x5 x5 x5 x5 x5 x5 nv x5                                           Neuro Re-Ed             TA cx 10"x10 x20 x30 HEP                                                                                       Ther Ex             Bridge 10"x10 x15 x20 x25 x30 x30 x30 x30 x30 x30   Clamshell B 10"x10 x15 x20 x25 x30 x30 x30 x30 x30 x30   H/L HS str  20"x5 x5 with towel x5 x5 x5 x5 x5 x5    SKTC w towel B  20"x5 x5  x5 x5 x5 x5 x5 x5    Wall gastroc str  20"x5 x5 x5 x5 x5 x5 x5 x5 x5   Nu Step for strength/endur-ance   5' L1 7' L5 7' 7' 10' 10' 10' 10'   Stand HF str   20''x5 x5 x5 x5 x5 x5 x5                 Ther Activity             Sit to stand  nv x10 x10 x10 x10 x10 x10 x10 x5 for re- eval   T ball squat  x10 X12, p!           FSU                                       Modalities

## 2023-01-16 ENCOUNTER — OFFICE VISIT (OUTPATIENT)
Dept: PHYSICAL THERAPY | Facility: CLINIC | Age: 83
End: 2023-01-16

## 2023-01-16 DIAGNOSIS — M17.12 PRIMARY OSTEOARTHRITIS OF LEFT KNEE: Primary | ICD-10-CM

## 2023-01-16 NOTE — PROGRESS NOTES
Daily Note     Today's date: 2023  Patient name: Lakeshia Leon  : 1940  MRN: [id number]  Referring provider: Dre Newman MD  Dx:   Encounter Diagnosis     ICD-10-CM    1  Primary osteoarthritis of left knee  M17 12       2  Spinal stenosis of lumbar region with neurogenic claudication  M48 062                      Subjective: Pt denies L knee pain 0/10 at arrival  Reports sleep disturbances due to her left knee pain  Pt is contemplating calling her doctor regarding this  Objective: See treatment diary below      Assessment: Tolerated treatment well  Patient would benefit from continued PT For improved strength, flexibility and overall function  Struggles with extension due to nature of OA  Pain-free throughout today's session  Plan: Continue per plan of care        Precautions:  PMH osteoporosis, melanoma, HTN    L knee  Manuals 23                                                            Neuro Re-Ed             QS 10"x15 x15 x20 x30 x30                                                                                      Ther Ex             Heel slide w strap 10"x15 x15 x20 x30 x30        Strap gastroc str 20"x5 x5 Wall, see below x5 x5        SLR 2x10 2x10 3x10 3x10 3x10        SAQ  x10 2x10 3x10 3x10        LAQ    2x10 3x10        TKE w t band   Blk 2x10 3x10 Silver 3x10        Leg press L seat @ 5     2x10 2pl        Nustep for LE strength/endurance     x10' L1        Ther Activity                                       Gait Training                                       Modalities             Ice
no

## 2023-01-19 ENCOUNTER — APPOINTMENT (OUTPATIENT)
Dept: PHYSICAL THERAPY | Facility: CLINIC | Age: 83
End: 2023-01-19

## 2023-01-24 ENCOUNTER — OFFICE VISIT (OUTPATIENT)
Dept: PHYSICAL THERAPY | Facility: CLINIC | Age: 83
End: 2023-01-24

## 2023-01-24 DIAGNOSIS — M17.12 PRIMARY OSTEOARTHRITIS OF LEFT KNEE: Primary | ICD-10-CM

## 2023-01-24 NOTE — PROGRESS NOTES
Daily Note     Today's date: 2023  Patient name: Romayne Furry  : 1940  MRN: [id number]  Referring provider: Claudia Cameron MD  Dx:   Encounter Diagnosis     ICD-10-CM    1  Primary osteoarthritis of left knee  M17 12                      Subjective: L knee pain 5/10, worse with prolonged sitting, "it gets stiff"  Objective: See treatment diary below      Assessment: Tolerated treatment well  Patient would benefit from continued PT      Plan: Continue per plan of care        Precautions:  PMH osteoporosis, melanoma, HTN    L knee  Manuals 23                                                           Neuro Re-Ed             QS 10"x15 x15 x20 x30 x30 x30                                                                                     Ther Ex             Heel slide w strap 10"x15 x15 x20 x30 x30 Shortsit 10"x 20       Strap gastroc str 20"x5 x5 Wall, see below x5 x5 x5       SLR 2x10 2x10 3x10 3x10 3x10 3x10  1#      SAQ  x10 2x10 3x10 3x10 3x10 2#       LAQ    2x10 3x10 3x10 2#       TKE w t band   Blk 2x10 3x10 Silver 3x10 3x10       Leg press L seat @ 5     2x10 2pl 3x10       Nustep for LE strength/endurance     x10' L1 10'       Ther Activity                                       Gait Training                                       Modalities             Ice

## 2023-01-27 ENCOUNTER — OFFICE VISIT (OUTPATIENT)
Dept: PHYSICAL THERAPY | Facility: CLINIC | Age: 83
End: 2023-01-27

## 2023-01-27 DIAGNOSIS — M17.12 PRIMARY OSTEOARTHRITIS OF LEFT KNEE: Primary | ICD-10-CM

## 2023-01-27 NOTE — PROGRESS NOTES
Daily Note     Today's date: 2023  Patient name: Suzy Johansen  : 1940  MRN: [id number]  Referring provider: Herman Douglas MD  Dx:   Encounter Diagnosis     ICD-10-CM    1  Primary osteoarthritis of left knee  M17 12           Start Time: 1044          Subjective: "Not too bad "  L knee pain 4/10  Objective: See treatment diary below      Assessment: Tolerated treatment well  Patient would benefit from continued PT      Plan: Continue per plan of care        Precautions:  PMH osteoporosis, melanoma, HTN    L knee  Manuals 23                                                          Neuro Re-Ed             QS 10"x15 x15 x20 x30 x30 x30 x30                                                                                    Ther Ex             Heel slide w strap 10"x15 x15 x20 x30 x30 Shortsit 10"x 20 x25      Strap gastroc str 20"x5 x5 Wall, see below x5 x5 x5 x5      SLR 2x10 2x10 3x10 3x10 3x10 3x10  1# 2x10      SAQ  x10 2x10 3x10 3x10 3x10 2# 3# 2x10      LAQ    2x10 3x10 3x10 2# 3# 2x10      TKE w t band   Blk 2x10 3x10 Silver 3x10 3x10 3x10      Leg press L seat @ 5     2x10 2pl 3x10 2x10 3pl      Nustep for LE strength/endurance     x10' L1 10' 10'      Ther Activity                                       Gait Training                                       Modalities             Ice

## 2023-01-30 ENCOUNTER — OFFICE VISIT (OUTPATIENT)
Dept: PHYSICAL THERAPY | Facility: CLINIC | Age: 83
End: 2023-01-30

## 2023-01-30 DIAGNOSIS — M17.12 PRIMARY OSTEOARTHRITIS OF LEFT KNEE: Primary | ICD-10-CM

## 2023-01-30 NOTE — PROGRESS NOTES
Daily Note     Today's date: 2023  Patient name: Gabby Domingo  : 1940  MRN: [id number]  Referring provider: Camilla Day MD  Dx:   Encounter Diagnosis     ICD-10-CM    1  Primary osteoarthritis of left knee  M17 12       2  Spinal stenosis of lumbar region with neurogenic claudication  M48 062                      Subjective: "When I first get up it's stiff, til I get moving then it's better " Pt denies L knee pain 0/10 at arrival  Does note sleep disturbance secondary to L knee pain at night  Objective: See treatment diary below      Assessment: Tolerated treatment well  Patient would benefit from continued PT For improved strength, flexibility and overall function  Progressed reps today without increased L knee pain  Plan: Continue per plan of care        Precautions:  PMH osteoporosis, melanoma, HTN    L knee  Manuals 23                                                         Neuro Re-Ed             QS 10"x15 x15 x20 x30 x30 x30 x30                                                                                    Ther Ex             Heel slide w strap 10"x15 x15 x20 x30 x30 Shortsit 10"x 20 x25 x30     Strap gastroc str 20"x5 x5 Wall, see below x5 x5 x5 x5 x5     SLR 2x10 2x10 3x10 3x10 3x10 3x10  1# 2x10 3x10     SAQ  x10 2x10 3x10 3x10 3x10 2# 3# 2x10 3x10     LAQ    2x10 3x10 3x10 2# 3# 2x10 3x10     TKE w t band   Blk 2x10 3x10 Silver 3x10 3x10 3x10 3x10     Leg press L seat @ 5     2x10 2pl 3x10 2x10 3pl 3x10     Nustep for LE strength/endurance     x10' L1 10' 10' 10'     Ther Activity                                       Gait Training                                       Modalities             Ice

## 2023-02-01 ENCOUNTER — APPOINTMENT (OUTPATIENT)
Dept: PHYSICAL THERAPY | Facility: CLINIC | Age: 83
End: 2023-02-01

## 2023-02-06 ENCOUNTER — EVALUATION (OUTPATIENT)
Dept: PHYSICAL THERAPY | Facility: CLINIC | Age: 83
End: 2023-02-06

## 2023-02-06 DIAGNOSIS — M17.12 PRIMARY OSTEOARTHRITIS OF LEFT KNEE: Primary | ICD-10-CM

## 2023-02-06 NOTE — PROGRESS NOTES
PT DISCHARGE    Today's date: 2023  Patient name: Suzy Johansen  : 1940  MRN: [id number]  Referring provider: Herman Douglas MD  Dx:   Encounter Diagnosis     ICD-10-CM    1  Primary osteoarthritis of left knee  M17 12                      Subjective: L knee pain 0/10, pt agreeable to D/C to HEP for self management  Pt would like to continue HEP here in fitness program   Doing steps in step-to pattern with rail  Objective: See treatment diary below    RE-EVALUATION:    Pain: 0-3/10 L knee (0-5/10 at eval 23)    FOTO functional score 63, projected score 61  Score at eval 58  Higher score = higher function  Gait: no AD, steady and non-antalgic  L knee A/PROM: 0-120 deg (0-8-120 deg at eval)    MMT: L quads, glute medius, glute max 5/5 (quads 4/5 with pain, glute medius 4-5, glute max 3-/5 at eval)  LLE dynamic strength and balance 124% of RLE via single leg step test         Assessment: Tolerated treatment well  Patient exhibited good technique with therapeutic exercises    Suzy Johansen has been compliant with attending PT and home exercise program since initial eval   Otilio Venegas  has made improvements in objective data since initial evalulation  Patient reports having improved level of function  It was mutually agreed to Discharge to home exercise program at this time  Shravan Mckinney attended 10 visits, missed 2 for her L knee  Goals  Impairment Goals  - Pt I with initial HEP in 1-2 visits - met  - Improve ROM equal to [facility] in 4-6 weeks - met  - Increase strength to 5/5 in all affected areas in 4-6 weeks - met    Functional Goals  - Increase Functional Status Measure to: 61 in 4-6 weeks (score 58 at eval) - exceeded  - Patient will be independent with comprehensive HEP in 4-6 weeks - met  - Stair climbing is improved to reciprocal pattern with rail in 4-6 weeks - not met  - LLE dynamic strength and balance at least 90% of RLE via single leg step test, in 4-6 wks   - exceeded    Plan: D/C to HEP, fitness program here for self management       Precautions:  PMH osteoporosis, melanoma, HTN    L knee  Manuals 1/4/23 1/6 1/9 1/11 1/16 1/24 1/27 1/30 2/6                                                        Neuro Re-Ed             QS 10"x15 x15 x20 x30 x30 x30 x30                                                                                    Ther Ex             Heel slide w strap 10"x15 x15 x20 x30 x30 Shortsit 10"x 20 x25 x30 x30    Strap gastroc str 20"x5 x5 Wall, see below x5 x5 x5 x5 x5 x5    SLR 2x10 2x10 3x10 3x10 3x10 3x10  1# 2x10 3x10 3x10    SAQ  x10 2x10 3x10 3x10 3x10 2# 3# 2x10 3x10 3x10    LAQ    2x10 3x10 3x10 2# 3# 2x10 3x10 3x10    TKE w t band   Blk 2x10 3x10 Silver 3x10 3x10 3x10 3x10 3x10    Leg press L seat @ 5     2x10 2pl 3x10 2x10 3pl 3x10 3x10    Nustep for LE strength/endurance     x10' L1 10' 10' 10' 10'    Ther Activity                                       Gait Training                                       Modalities             Ice

## 2023-02-08 ENCOUNTER — APPOINTMENT (OUTPATIENT)
Dept: PHYSICAL THERAPY | Facility: CLINIC | Age: 83
End: 2023-02-08

## 2024-04-29 ENCOUNTER — TELEPHONE (OUTPATIENT)
Dept: RHEUMATOLOGY | Facility: CLINIC | Age: 84
End: 2024-04-29

## 2024-06-05 ENCOUNTER — OFFICE VISIT (OUTPATIENT)
Age: 84
End: 2024-06-05
Payer: MEDICARE

## 2024-06-05 VITALS
SYSTOLIC BLOOD PRESSURE: 116 MMHG | OXYGEN SATURATION: 97 % | WEIGHT: 210 LBS | HEIGHT: 64 IN | BODY MASS INDEX: 35.85 KG/M2 | DIASTOLIC BLOOD PRESSURE: 78 MMHG | TEMPERATURE: 97.7 F | HEART RATE: 77 BPM

## 2024-06-05 DIAGNOSIS — M81.0 AGE-RELATED OSTEOPOROSIS WITHOUT CURRENT PATHOLOGICAL FRACTURE: Primary | ICD-10-CM

## 2024-06-05 DIAGNOSIS — E55.9 VITAMIN D DEFICIENCY: ICD-10-CM

## 2024-06-05 DIAGNOSIS — M15.0 PRIMARY GENERALIZED (OSTEO)ARTHRITIS: ICD-10-CM

## 2024-06-05 PROCEDURE — 99214 OFFICE O/P EST MOD 30 MIN: CPT | Performed by: PHYSICIAN ASSISTANT

## 2024-06-05 PROCEDURE — 96372 THER/PROPH/DIAG INJ SC/IM: CPT | Performed by: PHYSICIAN ASSISTANT

## 2024-06-05 NOTE — ASSESSMENT & PLAN NOTE
Generalized osteoarthritis symptoms are stable.  Follow-up with orthopedics as needed.  Home exercise program encouraged.

## 2024-06-05 NOTE — PROGRESS NOTES
"Assessment/Plan:    Cherrie Benites came into the [facility] today 06/05/24 to receive Prolia injection.      Verbal consent obtained.  Consent given by: patient    patient states patient has been medically healthy with no underlining concerns/complications.      Cherrie Benites presents with no symptoms today.       All insturctions were reviewed with the patient.    If the patient should have any questions/concerns, advised patient to contacted [facility].       Subjective:     History provided by: patient    Patient ID: Cherrie Benites is a 83 y.o. female      Objective:    Vitals:    06/05/24 1031   BP: 116/78   BP Location: Right arm   Patient Position: Sitting   Cuff Size: Adult   Pulse: 77   Temp: 97.7 °F (36.5 °C)   TempSrc: Temporal   SpO2: 97%   Weight: 95.3 kg (210 lb)   Height: 5' 4.25\" (1.632 m)       Patient tolerated the injection well without any complications.  Injection site/s upper left arm.  Medication was provided by office.    Patient signed consent form yes   Patient signed ABN form yes (If no patient is not a medicare patient).   Patient waited 15 minutes after injection no (This only applies to patient's receiving first time injection).       Last Visit: Visit date not found  Next visit:Visit date not found      "

## 2024-06-05 NOTE — PROGRESS NOTES
Ambulatory Visit  Name: Cherrie Benites      : 1940      MRN: [id number]  Encounter Provider: Blanca Brizuela PA-C  Encounter Date: 2024   Encounter department: [facility]    Assessment & Plan   1. Age-related osteoporosis without current pathological fracture  Assessment & Plan:  Osteoporosis is stable with Prolia.  She is due for dose #19 today.  She will be due for an updated DEXA scan in August of this year.  We will continue to monitor her bone density every 2 years.  Labs as ordered to monitor for medication side effects and toxicity.  Follow-up in 6 months or sooner if needed.  Orders:  -     Basic metabolic panel; Future  -     Vitamin D 25 hydroxy; Future  -     DXA bone density spine hip and pelvis; Future; Expected date: 2024  -     denosumab (PROLIA) subcutaneous injection 60 mg  -     Basic metabolic panel  -     Vitamin D 25 hydroxy  2. Primary generalized (osteo)arthritis  Assessment & Plan:  Generalized osteoarthritis symptoms are stable.  Follow-up with orthopedics as needed.  Home exercise program encouraged.  3. Vitamin D deficiency  Assessment & Plan:  We will check vitamin D with next labs.  Orders:  -     Vitamin D 25 hydroxy; Future  -     Vitamin D 25 hydroxy      History of Present Illness     Cherrie Benites is a 83 y.o. female.  She is here for follow-up of osteoporosis.  She was previously treated with oral bisphosphonates and is now on Prolia.  She is due for dose #19 today.   She had a DEXA scan done on 2022.  Left total hip T score -0.6, femoral neck -1.0.  These scores are stable as compared to her previous scan.  Right total hip T score 0, femoral neck -0.5.  Right total hip bone density has increased 5.8% as compared to her previous scan.  Left forearm T score -2.8.  This is stable as compared to her previous scan.     She has a history of osteoarthritis. She is following with orthopedics. She gets injections in her  left knee.  She has a history of lumbar spondylosis as well with neurogenic symptoms.  Her symptoms are generally stable.        Review of Systems   Constitutional:  Negative for chills, fatigue and fever.   HENT:  Negative for hearing loss, sore throat and tinnitus.    Eyes:  Negative for pain and visual disturbance.   Respiratory:  Negative for cough and shortness of breath.    Cardiovascular:  Negative for chest pain and palpitations.   Gastrointestinal:  Negative for abdominal pain, nausea and vomiting.   Genitourinary:  Negative for difficulty urinating.   Musculoskeletal:  Positive for arthralgias. Negative for back pain, gait problem, joint swelling, myalgias, neck pain and neck stiffness.   Skin:  Negative for rash.   Neurological:  Negative for dizziness, seizures, weakness, numbness and headaches.   Psychiatric/Behavioral:  Negative for confusion, decreased concentration and sleep disturbance.      Current Outpatient Medications on File Prior to Visit   Medication Sig Dispense Refill    ALPRAZolam (XANAX) 0.5 mg tablet TAKE 1/2-1 TABLET BY MOUTH EVERY 4-6 HOURS AS NEEDED  2    co-enzyme Q-10 30 MG capsule Take 30 mg by mouth 3 (three) times a day      denosumab (PROLIA) 60 mg/mL Inject 60 mg under the skin      ELDERBERRY PO Take by mouth      glucosamine-chondroitin 500-400 MG tablet Take 1 tablet by mouth daily      levothyroxine 50 mcg tablet Take 50 mcg by mouth daily  1    Loratadine 10 MG CAPS Take by mouth      metoprolol succinate (TOPROL-XL) 50 mg 24 hr tablet Take 50 mg by mouth daily  2    Multiple Vitamin (MULTIVITAMIN) tablet Take 1 tablet by mouth daily      omeprazole (PriLOSEC) 20 mg delayed release capsule Take 20 mg by mouth 2 (two) times a day  2    TURMERIC PO Take by mouth      aspirin 81 mg chewable tablet Chew 81 mg daily (Patient not taking: Reported on 5/16/2022)      azithromycin (ZITHROMAX) 250 mg tablet azithromycin 250 mg tablet (Patient not taking: No sig reported)       benazepril-hydrochlorthiazide (LOTENSIN HCT) 20-25 MG per tablet benazepril 20 mg-hydrochlorothiazide 25 mg tablet (Patient not taking: No sig reported)      betamethasone dipropionate (DIPROSONE) 0.05 % cream betamethasone dipropionate 0.05 % topical cream (Patient not taking: No sig reported)      betamethasone, augmented, (DIPROLENE-AF) 0.05 % cream APPLY THIN COAT TO AFFECTED AREA TWICE A DAY IN THE MORNING AND IN THE EVENING (Patient not taking: No sig reported)  1    calcium citrate-vitamin D (CITRACAL+D) 315-200 MG-UNIT per tablet Take 1 tablet by mouth daily (Patient not taking: Reported on 5/18/2023)      cephalexin (KEFLEX) 500 mg capsule cephalexin 500 mg capsule (Patient not taking: No sig reported)      cholecalciferol (VITAMIN D3) 1,000 units tablet Take 1,000 Units by mouth daily (Patient not taking: Reported on 6/5/2024)      clindamycin (CLEOCIN) 300 MG capsule clindamycin HCl 300 mg capsule (Patient not taking: No sig reported)      COMBIGAN 0.2-0.5 % INSTILL 1 DROP TO BOTH EYES TWICE DAILY (Patient not taking: Reported on 5/16/2022)  3    fluticasone (FLONASE) 50 mcg/act nasal spray fluticasone propionate 50 mcg/actuation nasal spray,suspension (Patient not taking: No sig reported)      ibuprofen (MOTRIN) 600 mg tablet ibuprofen 600 mg tablet (Patient not taking: No sig reported)      lisinopril-hydrochlorothiazide (PRINZIDE,ZESTORETIC) 20-25 MG per tablet Take 1 tablet by mouth daily (Patient not taking: Reported on 11/10/2021)  3    meloxicam (MOBIC) 15 mg tablet Take 15 mg by mouth daily as needed (Patient not taking: Reported on 11/10/2021)  2    metoprolol tartrate (LOPRESSOR) 50 mg tablet Take 50 mg by mouth 2 (two) times a day (Patient not taking: Reported on 11/10/2021)      naproxen (NAPROSYN) 375 mg tablet naproxen 375 mg tablet (Patient not taking: No sig reported)      olopatadine HCl (PATADAY) 0.2 % opth drops INSTILL 1 DROP TO BOTH EYES ONCE DAILY AS NEEDED (Patient not taking: No  "sig reported)  3    pravastatin (PRAVACHOL) 20 mg tablet pravastatin 20 mg tablet (Patient not taking: No sig reported)      quinapril-hydrochlorothiazide (ACCURETIC) 20-25 MG per tablet quinapril 20 mg-hydrochlorothiazide 25 mg tablet (Patient not taking: No sig reported)      rosuvastatin (CRESTOR) 5 mg tablet Take 5 mg by mouth daily (Patient not taking: Reported on 11/10/2021)  3    simvastatin (ZOCOR) 10 mg tablet simvastatin 10 mg tablet (Patient not taking: No sig reported)      traMADol (ULTRAM) 50 mg tablet tramadol 50 mg tablet (Patient not taking: No sig reported)      vancomycin (VANCOCIN) 125 MG capsule vancomycin 125 mg capsule (Patient not taking: No sig reported)       Current Facility-Administered Medications on File Prior to Visit   Medication Dose Route Frequency Provider Last Rate Last Admin    denosumab (PROLIA) subcutaneous injection 60 mg  60 mg Subcutaneous Once Blanca Brizuela PA-C        denosumab (PROLIA) subcutaneous injection 60 mg  60 mg Subcutaneous Once Blanca Brizuela PA-C          Objective     /78 (BP Location: Right arm, Patient Position: Sitting, Cuff Size: Adult)   Pulse 77   Temp 97.7 °F (36.5 °C) (Temporal)   Ht 5' 4.25\" (1.632 m)   Wt 95.3 kg (210 lb)   SpO2 97%   BMI 35.77 kg/m²     Physical Exam  Constitutional:       Appearance: Normal appearance.   Cardiovascular:      Rate and Rhythm: Normal rate and regular rhythm.   Pulmonary:      Breath sounds: Normal breath sounds.   Musculoskeletal:      Comments: Decreased lateral flexion neck.  Slightly decreased external rotation bilateral shoulders.  Full range of motion bilateral elbows, wrists.  Interphalangeal OA changes, squaring 1st CMC joints, Heberden's nodes bilateral hands.  No synovitis noted.  Full range of motion bilateral hips.  Slightly decreased flexion bilateral knees with right surgical scar noted.  Full range of motion bilateral ankles.  Hyperpronation, hammertoe deformities, hallux valgus " deformities, high insteps bilateral feet.    Skin:     General: Skin is warm and dry.   Neurological:      General: No focal deficit present.      Mental Status: She is alert.       Administrative Statements         Dragon Dictation software was used to dictate this note. It may contain errors with dictating incorrect words/spelling. Please contact provider directly for any questions.

## 2024-06-05 NOTE — ASSESSMENT & PLAN NOTE
Osteoporosis is stable with Prolia.  She is due for dose #19 today.  She will be due for an updated DEXA scan in August of this year.  We will continue to monitor her bone density every 2 years.  Labs as ordered to monitor for medication side effects and toxicity.  Follow-up in 6 months or sooner if needed.

## 2024-09-09 ENCOUNTER — OFFICE VISIT (OUTPATIENT)
Dept: UROLOGY | Facility: MEDICAL CENTER | Age: 84
End: 2024-09-09
Payer: MEDICARE

## 2024-09-09 ENCOUNTER — TELEPHONE (OUTPATIENT)
Dept: UROLOGY | Facility: MEDICAL CENTER | Age: 84
End: 2024-09-09

## 2024-09-09 VITALS
OXYGEN SATURATION: 95 % | HEIGHT: 65 IN | RESPIRATION RATE: 21 BRPM | DIASTOLIC BLOOD PRESSURE: 80 MMHG | WEIGHT: 209 LBS | HEART RATE: 80 BPM | BODY MASS INDEX: 34.82 KG/M2 | SYSTOLIC BLOOD PRESSURE: 128 MMHG

## 2024-09-09 DIAGNOSIS — R35.1 NOCTURIA: Primary | ICD-10-CM

## 2024-09-09 PROCEDURE — 99204 OFFICE O/P NEW MOD 45 MIN: CPT | Performed by: UROLOGY

## 2024-09-09 RX ORDER — DENOSUMAB 60 MG/ML
INJECTION SUBCUTANEOUS
COMMUNITY

## 2024-09-09 RX ORDER — LOSARTAN POTASSIUM 100 MG/1
TABLET ORAL DAILY
COMMUNITY

## 2024-09-09 RX ORDER — SOLIFENACIN SUCCINATE 10 MG/1
10 TABLET, FILM COATED ORAL DAILY
Qty: 30 TABLET | Refills: 3 | Status: SHIPPED | OUTPATIENT
Start: 2024-09-09

## 2024-09-09 NOTE — PROGRESS NOTES
"   HISTORY:    Complains of nocturia on 2-3 nights per week.  Usually she will only get up once per night.    However, on the bad nights she gets up 4-5 times.    Daytime does not have any frequency.    She does not think she leaks at all.  She wears a mini poise pad just to be sure, but it is not really wet.    No hematuria infections.    Passed a stone many years ago    CT of 2022 at [facility] showed a 6 mm nonobstructing right renal calyceal stone         ASSESSMENT / PLAN:    Unclear why she would only have 2-3 bad nights per week.    She says it is really bothersome, cannot get back to sleep.  She would like to try something    Solifenacin 10 mg each night, warned about side effects.    Reassess in 2 to 3 months    The following portions of the patient's history were reviewed and updated as appropriate: allergies, current medications, past family history, past medical history, past social history, past surgical history, and problem list.    Review of Systems      Objective:     Physical Exam  Abdominal:      Comments: Abdomen soft nontender           No results found for: \"PSA\"]  BUN   Date Value Ref Range Status   06/21/2024 17 7 - 25 mg/dL Final     Creatinine   Date Value Ref Range Status   06/21/2024 0.56 0.40 - 1.10 mg/dL Final     No components found for: \"CBC\"      Patient Active Problem List   Diagnosis    Age-related osteoporosis without current pathological fracture    Primary osteoarthritis of left knee    Essential hypertension    GERD (gastroesophageal reflux disease)    History of melanoma    Hyperlipidemia    Hypothyroidism    Primary generalized (osteo)arthritis    Lumbar spondylosis    Anxiety    Glaucoma    Solitary pulmonary nodule    Spinal stenosis of lumbar region with neurogenic claudication    Vitamin D deficiency    Nocturia        Diagnoses and all orders for this visit:    Nocturia  -     solifenacin (VESICARE) 10 MG tablet; Take 1 tablet (10 mg total) by mouth daily 1 to 2 hours before " bed    Other orders  -     losartan (COZAAR) 100 MG tablet; Take by mouth daily  -     denosumab (Prolia) 60 mg/mL;  (Patient not taking: Reported on 9/9/2024)  -     Multiple Vitamins-Minerals (PRESERVISION AREDS 2 PO); Take by mouth 2 (two) times a day           Patient ID: Cherrie Benites is a 83 y.o. female.      Current Outpatient Medications:     ALPRAZolam (XANAX) 0.5 mg tablet, TAKE 1/2-1 TABLET BY MOUTH EVERY 4-6 HOURS AS NEEDED, Disp: , Rfl: 2    co-enzyme Q-10 30 MG capsule, Take 30 mg by mouth 3 (three) times a day, Disp: , Rfl:     denosumab (PROLIA) 60 mg/mL, Inject 60 mg under the skin, Disp: , Rfl:     ELDERBERRY PO, Take by mouth, Disp: , Rfl:     glucosamine-chondroitin 500-400 MG tablet, Take 1 tablet by mouth daily, Disp: , Rfl:     levothyroxine 50 mcg tablet, Take 50 mcg by mouth daily, Disp: , Rfl: 1    losartan (COZAAR) 100 MG tablet, Take by mouth daily, Disp: , Rfl:     metoprolol succinate (TOPROL-XL) 50 mg 24 hr tablet, Take 50 mg by mouth daily, Disp: , Rfl: 2    Multiple Vitamin (MULTIVITAMIN) tablet, Take 1 tablet by mouth daily, Disp: , Rfl:     Multiple Vitamins-Minerals (PRESERVISION AREDS 2 PO), Take by mouth 2 (two) times a day, Disp: , Rfl:     omeprazole (PriLOSEC) 20 mg delayed release capsule, Take 20 mg by mouth 2 (two) times a day, Disp: , Rfl: 2    rosuvastatin (CRESTOR) 5 mg tablet, Take 5 mg by mouth daily [id number], Disp: , Rfl: 3    solifenacin (VESICARE) 10 MG tablet, Take 1 tablet (10 mg total) by mouth daily 1 to 2 hours before bed, Disp: 30 tablet, Rfl: 3    TURMERIC PO, Take by mouth, Disp: , Rfl:     aspirin 81 mg chewable tablet, Chew 81 mg daily (Patient not taking: Reported on 5/16/2022), Disp: , Rfl:     benazepril-hydrochlorthiazide (LOTENSIN HCT) 20-25 MG per tablet, benazepril 20 mg-hydrochlorothiazide 25 mg tablet (Patient not taking: No sig reported), Disp: , Rfl:     betamethasone dipropionate (DIPROSONE) 0.05 % cream, betamethasone dipropionate 0.05 %  topical cream (Patient not taking: No sig reported), Disp: , Rfl:     betamethasone, augmented, (DIPROLENE-AF) 0.05 % cream, APPLY THIN COAT TO AFFECTED AREA TWICE A DAY IN THE MORNING AND IN THE EVENING (Patient not taking: No sig reported), Disp: , Rfl: 1    calcium citrate-vitamin D (CITRACAL+D) 315-200 MG-UNIT per tablet, Take 1 tablet by mouth daily (Patient not taking: Reported on 5/18/2023), Disp: , Rfl:     cephalexin (KEFLEX) 500 mg capsule, cephalexin 500 mg capsule (Patient not taking: No sig reported), Disp: , Rfl:     cholecalciferol (VITAMIN D3) 1,000 units tablet, Take 1,000 Units by mouth daily (Patient not taking: Reported on 6/5/2024), Disp: , Rfl:     clindamycin (CLEOCIN) 300 MG capsule, clindamycin HCl 300 mg capsule (Patient not taking: No sig reported), Disp: , Rfl:     denosumab (Prolia) 60 mg/mL, , Disp: , Rfl:     fluticasone (FLONASE) 50 mcg/act nasal spray, fluticasone propionate 50 mcg/actuation nasal spray,suspension (Patient not taking: No sig reported), Disp: , Rfl:     lisinopril-hydrochlorothiazide (PRINZIDE,ZESTORETIC) 20-25 MG per tablet, Take 1 tablet by mouth daily (Patient not taking: Reported on 11/10/2021), Disp: , Rfl: 3    Loratadine 10 MG CAPS, Take by mouth (Patient not taking: Reported on 9/9/2024), Disp: , Rfl:     meloxicam (MOBIC) 15 mg tablet, Take 15 mg by mouth daily as needed (Patient not taking: Reported on 11/10/2021), Disp: , Rfl: 2    metoprolol tartrate (LOPRESSOR) 50 mg tablet, Take 50 mg by mouth 2 (two) times a day (Patient not taking: Reported on 11/10/2021), Disp: , Rfl:     naproxen (NAPROSYN) 375 mg tablet, naproxen 375 mg tablet (Patient not taking: No sig reported), Disp: , Rfl:     pravastatin (PRAVACHOL) 20 mg tablet, pravastatin 20 mg tablet (Patient not taking: No sig reported), Disp: , Rfl:     quinapril-hydrochlorothiazide (ACCURETIC) 20-25 MG per tablet, quinapril 20 mg-hydrochlorothiazide 25 mg tablet (Patient not taking: No sig reported),  Disp: , Rfl:     simvastatin (ZOCOR) 10 mg tablet, simvastatin 10 mg tablet (Patient not taking: No sig reported), Disp: , Rfl:     traMADol (ULTRAM) 50 mg tablet, tramadol 50 mg tablet (Patient not taking: No sig reported), Disp: , Rfl:     vancomycin (VANCOCIN) 125 MG capsule, vancomycin 125 mg capsule (Patient not taking: No sig reported), Disp: , Rfl:     Current Facility-Administered Medications:     denosumab (PROLIA) subcutaneous injection 60 mg, 60 mg, Subcutaneous, Once, Blanca Brizuela PA-C    denosumab (PROLIA) subcutaneous injection 60 mg, 60 mg, Subcutaneous, Once, Blanca Brizuela PA-C    Past Medical History:   Diagnosis Date    Cancer (HCC)     squamous cell skin cancer    GERD (gastroesophageal reflux disease)     Hyperlipidemia     Hypertension     Hypothyroidism     Kidney stone     Osteoarthritis     Osteoporosis     Pre-diabetes        Past Surgical History:   Procedure Laterality Date    APPENDECTOMY      CATARACT EXTRACTION      CHOLECYSTECTOMY N/A     FOOT SURGERY      HYSTERECTOMY      KNEE SURGERY  2010    SKIN CANCER EXCISION Right     squamous cell skin cancer face       Social History

## 2024-09-13 LAB — 25(OH)D3+25(OH)D2 SERPL-MCNC: 42 NG/ML (ref 30–100)

## 2024-09-13 NOTE — TELEPHONE ENCOUNTER
Called patient - she is told that we don't have a urinalysis from 09/9.  She states that she is going to the lab on 09/16/24 for urinalysis.  We will look for results on 09/18/24

## 2024-09-13 NOTE — TELEPHONE ENCOUNTER
Patient called in asking for urine results from her visit the other day. I don't see anything in chart. Please call patient to let her know of status.

## 2024-09-19 NOTE — TELEPHONE ENCOUNTER
According to chart, no record of urine testing being completed at this time. Checked CareEverywhere as well as lab section of chart, and no testing noted.

## 2024-10-02 ENCOUNTER — TELEPHONE (OUTPATIENT)
Dept: RHEUMATOLOGY | Facility: CLINIC | Age: 84
End: 2024-10-02

## 2024-10-03 DIAGNOSIS — R35.1 NOCTURIA: ICD-10-CM

## 2024-10-03 RX ORDER — SOLIFENACIN SUCCINATE 10 MG/1
10 TABLET, FILM COATED ORAL DAILY
Qty: 90 TABLET | Refills: 2 | Status: SHIPPED | OUTPATIENT
Start: 2024-10-03

## 2024-10-31 ENCOUNTER — HOSPITAL ENCOUNTER (OUTPATIENT)
Dept: BONE DENSITY | Facility: MEDICAL CENTER | Age: 84
Discharge: HOME | End: 2024-10-31
Payer: MEDICARE

## 2024-10-31 DIAGNOSIS — M81.0 AGE-RELATED OSTEOPOROSIS WITHOUT CURRENT PATHOLOGICAL FRACTURE: ICD-10-CM

## 2024-10-31 PROCEDURE — 77080 DXA BONE DENSITY AXIAL: CPT

## 2024-12-02 ENCOUNTER — TELEPHONE (OUTPATIENT)
Age: 84
End: 2024-12-02

## 2024-12-05 NOTE — PROGRESS NOTES
Name: Cherrie Benites      : 1940      MRN: [id number]  Encounter Provider: Blanca Brizuela PA-C  Encounter Date: 12/10/2024   Enc[facility]  :  Assessment & Plan  Age-related osteoporosis without current pathological fracture  Osteoporosis is stable with Prolia.  She is due for dose #20 today.  She is up-to-date with her DEXA scan and will be due for an updated DEXA in 2026.  We will continue to monitor her bone density every 2 years.  Labs as ordered to monitor for medication side effects and toxicity.  Follow-up in 6 months or sooner if needed.    Orders:    Basic metabolic panel; Future    denosumab (PROLIA) subcutaneous injection 60 mg        History of Present Illness   Cherrie Benites is a 84 y.o. female.  She is here for follow-up of osteoporosis.  She was previously treated with oral bisphosphonates and is now on Prolia.  She is due for dose #20 today.   She had a DEXA scan done on 10/31/2024.  Lumbar spine (L1, L3, L4) T-score +1.8.  Left total hip T-score -0.5, femoral neck -0.5.     She has a history of osteoarthritis. She is following with orthopedics. She gets injections in her left knee.  She has a history of lumbar spondylosis as well with neurogenic symptoms.  Her symptoms are generally stable.    She had labs done on 2024.  Glucose 115.  Creatinine 0.62, GFR 83.  Calcium 9.3.  Vitamin D 36.8.          Review of Systems   Constitutional:  Negative for chills, fatigue and fever.   HENT:  Negative for hearing loss, sore throat and tinnitus.    Eyes:  Negative for pain and visual disturbance.   Respiratory:  Negative for cough and shortness of breath.    Cardiovascular:  Negative for chest pain and palpitations.   Gastrointestinal:  Negative for abdominal pain, nausea and vomiting.   Genitourinary:  Negative for difficulty urinating.   Musculoskeletal:  Positive for arthralgias. Negative for back pain, gait problem,  joint swelling, myalgias, neck pain and neck stiffness.   Skin:  Negative for rash.   Neurological:  Negative for dizziness, seizures, weakness, numbness and headaches.   Psychiatric/Behavioral:  Negative for confusion, decreased concentration and sleep disturbance.      Current Outpatient Medications on File Prior to Visit   Medication Sig Dispense Refill    ALPRAZolam (XANAX) 0.5 mg tablet TAKE 1/2-1 TABLET BY MOUTH EVERY 4-6 HOURS AS NEEDED  2    co-enzyme Q-10 30 MG capsule Take 30 mg by mouth 3 (three) times a day      denosumab (PROLIA) 60 mg/mL Inject 60 mg under the skin      ELDERBERRY PO Take by mouth      glucosamine-chondroitin 500-400 MG tablet Take 1 tablet by mouth daily      levothyroxine 50 mcg tablet Take 50 mcg by mouth daily  1    losartan (COZAAR) 100 MG tablet Take by mouth daily      metoprolol succinate (TOPROL-XL) 50 mg 24 hr tablet Take 50 mg by mouth daily  2    Multiple Vitamin (MULTIVITAMIN) tablet Take 1 tablet by mouth daily      Multiple Vitamins-Minerals (PRESERVISION AREDS 2 PO) Take by mouth 2 (two) times a day      omeprazole (PriLOSEC) 20 mg delayed release capsule Take 20 mg by mouth 2 (two) times a day  2    rosuvastatin (CRESTOR) 5 mg tablet Take 5 mg by mouth daily [id number]  3    solifenacin (VESICARE) 10 MG tablet TAKE 1 TABLET (10 MG TOTAL) BY MOUTH DAILY 1 TO 2 HOURS BEFORE BED 90 tablet 2    TURMERIC PO Take by mouth      aspirin 81 mg chewable tablet Chew 81 mg daily (Patient not taking: Reported on 12/10/2024)      benazepril-hydrochlorthiazide (LOTENSIN HCT) 20-25 MG per tablet benazepril 20 mg-hydrochlorothiazide 25 mg tablet (Patient not taking: Reported on 12/10/2024)      betamethasone dipropionate (DIPROSONE) 0.05 % cream betamethasone dipropionate 0.05 % topical cream (Patient not taking: Reported on 12/10/2024)      betamethasone, augmented, (DIPROLENE-AF) 0.05 % cream APPLY THIN COAT TO AFFECTED AREA TWICE A DAY IN THE MORNING AND IN THE EVENING (Patient  not taking: Reported on 12/10/2024)  1    calcium citrate-vitamin D (CITRACAL+D) 315-200 MG-UNIT per tablet Take 1 tablet by mouth daily (Patient not taking: Reported on 12/10/2024)      cephalexin (KEFLEX) 500 mg capsule cephalexin 500 mg capsule (Patient not taking: Reported on 12/10/2024)      cholecalciferol (VITAMIN D3) 1,000 units tablet Take 1,000 Units by mouth daily (Patient not taking: Reported on 12/10/2024)      clindamycin (CLEOCIN) 300 MG capsule clindamycin HCl 300 mg capsule (Patient not taking: Reported on 12/10/2024)      denosumab (Prolia) 60 mg/mL  (Patient not taking: Reported on 12/10/2024)      fluticasone (FLONASE) 50 mcg/act nasal spray fluticasone propionate 50 mcg/actuation nasal spray,suspension (Patient not taking: Reported on 12/10/2024)      lisinopril-hydrochlorothiazide (PRINZIDE,ZESTORETIC) 20-25 MG per tablet Take 1 tablet by mouth daily (Patient not taking: Reported on 12/10/2024)  3    Loratadine 10 MG CAPS Take by mouth (Patient not taking: Reported on 12/10/2024)      meloxicam (MOBIC) 15 mg tablet Take 15 mg by mouth daily as needed (Patient not taking: Reported on 12/10/2024)  2    metoprolol tartrate (LOPRESSOR) 50 mg tablet Take 50 mg by mouth 2 (two) times a day (Patient not taking: Reported on 12/10/2024)      naproxen (NAPROSYN) 375 mg tablet naproxen 375 mg tablet (Patient not taking: Reported on 12/10/2024)      pravastatin (PRAVACHOL) 20 mg tablet pravastatin 20 mg tablet (Patient not taking: Reported on 12/10/2024)      quinapril-hydrochlorothiazide (ACCURETIC) 20-25 MG per tablet quinapril 20 mg-hydrochlorothiazide 25 mg tablet (Patient not taking: Reported on 12/10/2024)      simvastatin (ZOCOR) 10 mg tablet simvastatin 10 mg tablet (Patient not taking: Reported on 12/10/2024)      traMADol (ULTRAM) 50 mg tablet tramadol 50 mg tablet (Patient not taking: Reported on 12/10/2024)      vancomycin (VANCOCIN) 125 MG capsule vancomycin 125 mg capsule (Patient not taking:  "Reported on 12/10/2024)       Current Facility-Administered Medications on File Prior to Visit   Medication Dose Route Frequency Provider Last Rate Last Admin    denosumab (PROLIA) subcutaneous injection 60 mg  60 mg Subcutaneous Once Blanca Brizuela PA-C        denosumab (PROLIA) subcutaneous injection 60 mg  60 mg Subcutaneous Once Blanca Brizuela PA-C             Objective   /70 (BP Location: Left arm, Patient Position: Sitting, Cuff Size: Large)   Pulse 63   Temp (!) 97.3 °F (36.3 °C) (Temporal)   Ht 5' 4.75\" (1.645 m)   Wt 96.4 kg (212 lb 9.6 oz)   SpO2 93%   BMI 35.65 kg/m²      Physical Exam  Constitutional:       Appearance: Normal appearance.   Cardiovascular:      Rate and Rhythm: Normal rate and regular rhythm.   Pulmonary:      Breath sounds: Normal breath sounds.   Musculoskeletal:      Comments: Decreased lateral flexion neck.  Slightly decreased external rotation bilateral shoulders.  Full range of motion bilateral elbows, wrists.  Interphalangeal OA changes, squaring 1st CMC joints, Heberden's nodes bilateral hands.  No synovitis noted.  Full range of motion bilateral hips.  Slightly decreased flexion bilateral knees with right surgical scar noted.  Full range of motion bilateral ankles.  Hyperpronation, hammertoe deformities, hallux valgus deformities, high insteps bilateral feet.    Skin:     General: Skin is warm and dry.   Neurological:      General: No focal deficit present.      Mental Status: She is alert.           Dragon Dictation software was used to dictate this note. It may contain errors with dictating incorrect words/spelling. Please contact provider directly for any questions.    "

## 2024-12-06 ENCOUNTER — TELEPHONE (OUTPATIENT)
Age: 84
End: 2024-12-06

## 2024-12-06 NOTE — TELEPHONE ENCOUNTER
Pt calling in again regarding this. She is asking if the scripts were sent to the lab. Explained that as long as they are ordered, [facility] will be able to draw the labs. Do not need copy of physical script.

## 2024-12-06 NOTE — TELEPHONE ENCOUNTER
Pt calls back informed her that labs are needed for her upcoming appt. Pt states she had some back in sep but will go again. Pt going to [facility] Monday morning

## 2024-12-09 ENCOUNTER — APPOINTMENT (OUTPATIENT)
Dept: LAB | Facility: CLINIC | Age: 84
End: 2024-12-09
Payer: MEDICARE

## 2024-12-09 LAB
25(OH)D3 SERPL-MCNC: 36.8 NG/ML (ref 30–100)
ANION GAP SERPL CALCULATED.3IONS-SCNC: 7 MMOL/L (ref 4–13)
BUN SERPL-MCNC: 17 MG/DL (ref 5–25)
CALCIUM SERPL-MCNC: 9.3 MG/DL (ref 8.4–10.2)
CHLORIDE SERPL-SCNC: 104 MMOL/L (ref 96–108)
CO2 SERPL-SCNC: 28 MMOL/L (ref 21–32)
CREAT SERPL-MCNC: 0.62 MG/DL (ref 0.6–1.3)
GFR SERPL CREATININE-BSD FRML MDRD: 83 ML/MIN/1.73SQ M
GLUCOSE P FAST SERPL-MCNC: 115 MG/DL (ref 65–99)
POTASSIUM SERPL-SCNC: 4 MMOL/L (ref 3.5–5.3)
SODIUM SERPL-SCNC: 139 MMOL/L (ref 135–147)

## 2024-12-10 ENCOUNTER — OFFICE VISIT (OUTPATIENT)
Age: 84
End: 2024-12-10

## 2024-12-10 VITALS
WEIGHT: 212.6 LBS | HEIGHT: 65 IN | SYSTOLIC BLOOD PRESSURE: 130 MMHG | BODY MASS INDEX: 35.42 KG/M2 | HEART RATE: 63 BPM | TEMPERATURE: 97.3 F | DIASTOLIC BLOOD PRESSURE: 70 MMHG | OXYGEN SATURATION: 93 %

## 2024-12-10 DIAGNOSIS — M81.0 AGE-RELATED OSTEOPOROSIS WITHOUT CURRENT PATHOLOGICAL FRACTURE: Primary | ICD-10-CM

## 2024-12-10 NOTE — PROGRESS NOTES
"Assessment/Plan:    Cherrie Benites came into the [facility] Rheumatology Office today 12/10/24 to receive Prolia injection.      Verbal consent obtained.  Consent given by: patient    patient states patient has been medically healthy with no underlining concerns/complications.      Cherrie Benites presents with no symptoms today.       All insturctions were reviewed with the patient.    If the patient should have any questions/concerns, advised patient to contacted [facility].       Subjective:     History provided by: patient    Patient ID: Cherrie Benites is a 84 y.o. female      Objective:    Vitals:    12/10/24 1128   BP: 130/70   BP Location: Left arm   Patient Position: Sitting   Cuff Size: Large   Pulse: 63   Temp: (!) 97.3 °F (36.3 °C)   TempSrc: Temporal   SpO2: 93%   Weight: 96.4 kg (212 lb 9.6 oz)   Height: 5' 4.75\" (1.645 m)       Patient tolerated the injection well without any complications.  Injection site/s upper left arm.  Medication was provided by office.    Patient signed consent form yes   Patient signed ABN form yes (If no patient is not a medicare patient).   Patient waited 15 minutes after injection no (This only applies to patient's receiving first time injection).       Last Visit: 9/13/2024  Next visit:Visit date not found      "

## 2024-12-10 NOTE — ASSESSMENT & PLAN NOTE
Osteoporosis is stable with Prolia.  She is due for dose #20 today.  She is up-to-date with her DEXA scan and will be due for an updated DEXA in October 2026.  We will continue to monitor her bone density every 2 years.  Labs as ordered to monitor for medication side effects and toxicity.  Follow-up in 6 months or sooner if needed.    Orders:    Basic metabolic panel; Future    denosumab (PROLIA) subcutaneous injection 60 mg

## 2025-01-15 ENCOUNTER — TELEPHONE (OUTPATIENT)
Age: 85
End: 2025-01-15

## 2025-01-15 NOTE — TELEPHONE ENCOUNTER
Patient calling inquiring if she needs to bring urine sample to appointment tomorrow. Informed she should come to the appointment with a comfortably full bladder and they will collect urine sample there if indicated and either way she will need to VOID just prior to PVR.

## 2025-01-16 ENCOUNTER — OFFICE VISIT (OUTPATIENT)
Dept: UROLOGY | Facility: MEDICAL CENTER | Age: 85
End: 2025-01-16
Payer: MEDICARE

## 2025-01-16 VITALS
HEART RATE: 86 BPM | BODY MASS INDEX: 34.16 KG/M2 | SYSTOLIC BLOOD PRESSURE: 126 MMHG | WEIGHT: 205 LBS | HEIGHT: 65 IN | DIASTOLIC BLOOD PRESSURE: 80 MMHG | OXYGEN SATURATION: 96 %

## 2025-01-16 DIAGNOSIS — R35.1 NOCTURIA: Primary | ICD-10-CM

## 2025-01-16 DIAGNOSIS — N20.0 CALCULUS OF KIDNEY: ICD-10-CM

## 2025-01-16 LAB — POST-VOID RESIDUAL VOLUME, ML POC: 110 ML

## 2025-01-16 PROCEDURE — 51798 US URINE CAPACITY MEASURE: CPT | Performed by: UROLOGY

## 2025-01-16 PROCEDURE — 99213 OFFICE O/P EST LOW 20 MIN: CPT | Performed by: UROLOGY

## 2025-01-16 NOTE — PROGRESS NOTES
"   HISTORY:    Follow-up nocturia, started Solifenacin 10 mg each evening, September 2024.  It helps a lot, only wakes up once at night.  No incontinence.    Passed a stone many years ago     CT of 2022 at [facility] showed a 6 mm nonobstructing right renal calyceal stone         ASSESSMENT / PLAN:    Doing well.    She has dry mouth from the medication    She will try taking the Solifenacin every other day to see if that works well enough.    Alternatively, could take 5 mg per evening if that dose is enough.  She will let us know    Follow-up 1 year        Review of Systems      Objective:     Physical Exam      No results found for: \"PSA\"]  BUN   Date Value Ref Range Status   12/09/2024 17 5 - 25 mg/dL Final   09/13/2024 16 7 - 25 mg/dL Final     Creatinine   Date Value Ref Range Status   12/09/2024 0.62 0.60 - 1.30 mg/dL Final     Comment:     Standardized to IDMS reference method   09/13/2024 0.59 0.40 - 1.10 mg/dL Final     No components found for: \"CBC\"    Patient Active Problem List   Diagnosis    Age-related osteoporosis without current pathological fracture    Primary osteoarthritis of left knee    Essential hypertension    GERD (gastroesophageal reflux disease)    History of melanoma    Hyperlipidemia    Hypothyroidism    Primary generalized (osteo)arthritis    Lumbar spondylosis    Anxiety    Glaucoma    Solitary pulmonary nodule    Spinal stenosis of lumbar region with neurogenic claudication    Vitamin D deficiency    Nocturia        Patient ID: Cherrie Benites is a 84 y.o. female.      Current Outpatient Medications:     ALPRAZolam (XANAX) 0.5 mg tablet, TAKE 1/2-1 TABLET BY MOUTH EVERY 4-6 HOURS AS NEEDED, Disp: , Rfl: 2    aspirin 81 mg chewable tablet, Chew 81 mg daily (Patient not taking: Reported on 12/10/2024), Disp: , Rfl:     benazepril-hydrochlorthiazide (LOTENSIN HCT) 20-25 MG per tablet, benazepril 20 mg-hydrochlorothiazide 25 mg tablet (Patient not taking: Reported on 12/10/2024), Disp: , Rfl:     " betamethasone dipropionate (DIPROSONE) 0.05 % cream, betamethasone dipropionate 0.05 % topical cream (Patient not taking: Reported on 12/10/2024), Disp: , Rfl:     betamethasone, augmented, (DIPROLENE-AF) 0.05 % cream, APPLY THIN COAT TO AFFECTED AREA TWICE A DAY IN THE MORNING AND IN THE EVENING (Patient not taking: Reported on 12/10/2024), Disp: , Rfl: 1    calcium citrate-vitamin D (CITRACAL+D) 315-200 MG-UNIT per tablet, Take 1 tablet by mouth daily (Patient not taking: Reported on 12/10/2024), Disp: , Rfl:     cephalexin (KEFLEX) 500 mg capsule, cephalexin 500 mg capsule (Patient not taking: Reported on 12/10/2024), Disp: , Rfl:     cholecalciferol (VITAMIN D3) 1,000 units tablet, Take 1,000 Units by mouth daily (Patient not taking: Reported on 12/10/2024), Disp: , Rfl:     clindamycin (CLEOCIN) 300 MG capsule, clindamycin HCl 300 mg capsule (Patient not taking: Reported on 12/10/2024), Disp: , Rfl:     co-enzyme Q-10 30 MG capsule, Take 30 mg by mouth 3 (three) times a day, Disp: , Rfl:     denosumab (PROLIA) 60 mg/mL, Inject 60 mg under the skin, Disp: , Rfl:     denosumab (Prolia) 60 mg/mL, , Disp: , Rfl:     ELDERBERRY PO, Take by mouth, Disp: , Rfl:     fluticasone (FLONASE) 50 mcg/act nasal spray, fluticasone propionate 50 mcg/actuation nasal spray,suspension (Patient not taking: Reported on 12/10/2024), Disp: , Rfl:     glucosamine-chondroitin 500-400 MG tablet, Take 1 tablet by mouth daily, Disp: , Rfl:     levothyroxine 50 mcg tablet, Take 50 mcg by mouth daily, Disp: , Rfl: 1    lisinopril-hydrochlorothiazide (PRINZIDE,ZESTORETIC) 20-25 MG per tablet, Take 1 tablet by mouth daily (Patient not taking: Reported on 12/10/2024), Disp: , Rfl: 3    Loratadine 10 MG CAPS, Take by mouth (Patient not taking: Reported on 12/10/2024), Disp: , Rfl:     losartan (COZAAR) 100 MG tablet, Take by mouth daily, Disp: , Rfl:     meloxicam (MOBIC) 15 mg tablet, Take 15 mg by mouth daily as needed (Patient not taking:  Reported on 12/10/2024), Disp: , Rfl: 2    metoprolol succinate (TOPROL-XL) 50 mg 24 hr tablet, Take 50 mg by mouth daily, Disp: , Rfl: 2    metoprolol tartrate (LOPRESSOR) 50 mg tablet, Take 50 mg by mouth 2 (two) times a day (Patient not taking: Reported on 12/10/2024), Disp: , Rfl:     Multiple Vitamin (MULTIVITAMIN) tablet, Take 1 tablet by mouth daily, Disp: , Rfl:     Multiple Vitamins-Minerals (PRESERVISION AREDS 2 PO), Take by mouth 2 (two) times a day, Disp: , Rfl:     naproxen (NAPROSYN) 375 mg tablet, naproxen 375 mg tablet (Patient not taking: Reported on 12/10/2024), Disp: , Rfl:     omeprazole (PriLOSEC) 20 mg delayed release capsule, Take 20 mg by mouth 2 (two) times a day, Disp: , Rfl: 2    pravastatin (PRAVACHOL) 20 mg tablet, pravastatin 20 mg tablet (Patient not taking: Reported on 12/10/2024), Disp: , Rfl:     quinapril-hydrochlorothiazide (ACCURETIC) 20-25 MG per tablet, quinapril 20 mg-hydrochlorothiazide 25 mg tablet (Patient not taking: Reported on 12/10/2024), Disp: , Rfl:     rosuvastatin (CRESTOR) 5 mg tablet, Take 5 mg by mouth daily [id number], Disp: , Rfl: 3    simvastatin (ZOCOR) 10 mg tablet, simvastatin 10 mg tablet (Patient not taking: Reported on 12/10/2024), Disp: , Rfl:     solifenacin (VESICARE) 10 MG tablet, TAKE 1 TABLET (10 MG TOTAL) BY MOUTH DAILY 1 TO 2 HOURS BEFORE BED, Disp: 90 tablet, Rfl: 2    traMADol (ULTRAM) 50 mg tablet, tramadol 50 mg tablet (Patient not taking: Reported on 12/10/2024), Disp: , Rfl:     TURMERIC PO, Take by mouth, Disp: , Rfl:     vancomycin (VANCOCIN) 125 MG capsule, vancomycin 125 mg capsule (Patient not taking: Reported on 12/10/2024), Disp: , Rfl:     Current Facility-Administered Medications:     denosumab (PROLIA) subcutaneous injection 60 mg, 60 mg, Subcutaneous, Once, Blanca Brizuela PA-C    denosumab (PROLIA) subcutaneous injection 60 mg, 60 mg, Subcutaneous, Once, Balnca Brizuela PA-C

## 2025-06-04 ENCOUNTER — TELEPHONE (OUTPATIENT)
Age: 85
End: 2025-06-04

## 2025-06-04 NOTE — TELEPHONE ENCOUNTER
Patient called in stating that she does not have the BMP script for her to complete. She asked if we can send her a copy on [facility] or she can come to the office tomorrow to  a copy.

## 2025-06-06 LAB
ANION GAP SERPL CALCULATED.3IONS-SCNC: 10 MMOL/L (ref 3–11)
BUN SERPL-MCNC: 18 MG/DL (ref 7–25)
CALCIUM SERPL-MCNC: 9.7 MG/DL (ref 8.5–10.5)
CHLORIDE SERPL-SCNC: 104 MMOL/L (ref 100–109)
CO2 SERPL-SCNC: 27 MMOL/L (ref 21–31)
CREAT SERPL-MCNC: 0.62 MG/DL (ref 0.4–1.1)
CYTOLOGY CMNT CVX/VAG CYTO-IMP: ABNORMAL
GFR/BSA.PRED SERPLBLD CYS-BASED-ARV: 87 ML/MIN/{1.73_M2}
GLUCOSE SERPL-MCNC: 106 MG/DL (ref 65–99)
POTASSIUM SERPL-SCNC: 4.2 MMOL/L (ref 3.5–5.2)
SODIUM SERPL-SCNC: 141 MMOL/L (ref 135–145)

## 2025-06-11 ENCOUNTER — OFFICE VISIT (OUTPATIENT)
Age: 85
End: 2025-06-11
Payer: MEDICARE

## 2025-06-11 VITALS
SYSTOLIC BLOOD PRESSURE: 128 MMHG | HEIGHT: 65 IN | HEART RATE: 69 BPM | OXYGEN SATURATION: 95 % | BODY MASS INDEX: 34.66 KG/M2 | WEIGHT: 208 LBS | DIASTOLIC BLOOD PRESSURE: 74 MMHG

## 2025-06-11 DIAGNOSIS — M81.0 AGE-RELATED OSTEOPOROSIS WITHOUT CURRENT PATHOLOGICAL FRACTURE: Primary | ICD-10-CM

## 2025-06-11 DIAGNOSIS — E55.9 VITAMIN D DEFICIENCY: ICD-10-CM

## 2025-06-11 PROCEDURE — 96372 THER/PROPH/DIAG INJ SC/IM: CPT | Performed by: PHYSICIAN ASSISTANT

## 2025-06-11 PROCEDURE — 99214 OFFICE O/P EST MOD 30 MIN: CPT | Performed by: PHYSICIAN ASSISTANT

## 2025-06-11 NOTE — ASSESSMENT & PLAN NOTE
Osteoporosis currently being treated with Prolia.  She is due for dose #21.  She is up-to-date with her DEXA scan and will be due for an updated DEXA in October 2026.  Follow-up in 6 months or sooner if needed.    Orders:    Basic metabolic panel; Future    Vitamin D 25 hydroxy; Future    denosumab (PROLIA) subcutaneous injection 60 mg

## 2025-06-11 NOTE — PROGRESS NOTES
Name: Cherrie Benites      : 1940      MRN: [id number]  Encounter Provider: Blanca Brizuela PA-C  Encounter Date: 2025   Encounter [facility]  :  Assessment & Plan  Age-related osteoporosis without current pathological fracture  Osteoporosis currently being treated with Prolia.  She is due for dose #21.  She is up-to-date with her DEXA scan and will be due for an updated DEXA in 2026.  Follow-up in 6 months or sooner if needed.    Orders:    Basic metabolic panel; Future    Vitamin D 25 hydroxy; Future    denosumab (PROLIA) subcutaneous injection 60 mg    Vitamin D deficiency    Orders:    Vitamin D 25 hydroxy; Future        History of Present Illness   Cherrie Benites is a 84 y.o. female.  She is here for follow-up of osteoporosis.  She was previously treated with oral bisphosphonates and is now on Prolia.  She is due for dose #21 today.   She had a DEXA scan done on 10/31/2024.  Lumbar spine (L1, L3, L4) T-score +1.8.  Left total hip T-score -0.5, femoral neck -0.5.     She has a history of osteoarthritis. She is following with orthopedics. She gets injections in her left knee.  She has a history of lumbar spondylosis as well with neurogenic symptoms.  Her symptoms are generally stable.    She had labs done on 2025.  Glucose 106.  Creatinine 0.62, GFR 87.  Calcium 9.7.            Review of Systems   Constitutional:  Negative for chills, fatigue and fever.   HENT:  Negative for hearing loss, sore throat and tinnitus.    Eyes:  Negative for pain and visual disturbance.   Respiratory:  Negative for cough and shortness of breath.    Cardiovascular:  Negative for chest pain and palpitations.   Gastrointestinal:  Negative for abdominal pain, nausea and vomiting.   Genitourinary:  Negative for difficulty urinating.   Musculoskeletal:  Positive for arthralgias. Negative for back pain, gait problem, joint swelling, myalgias, neck pain and neck  "stiffness.   Skin:  Negative for rash.   Neurological:  Negative for dizziness, seizures, weakness, numbness and headaches.   Psychiatric/Behavioral:  Negative for confusion, decreased concentration and sleep disturbance.      Medications Ordered Prior to Encounter[1]      Objective   /74   Pulse 69   Ht 5' 4.75\" (1.645 m)   Wt 94.3 kg (208 lb)   SpO2 95%   BMI 34.88 kg/m²      Physical Exam  Constitutional:       Appearance: Normal appearance.     Cardiovascular:      Rate and Rhythm: Normal rate and regular rhythm.   Pulmonary:      Breath sounds: Normal breath sounds.     Musculoskeletal:      Comments: Decreased lateral flexion neck.  Slightly decreased external rotation bilateral shoulders.  Full range of motion bilateral elbows, wrists.  Interphalangeal OA changes, squaring 1st CMC joints, Heberden's nodes bilateral hands.  No synovitis noted.  Full range of motion bilateral hips.  Slightly decreased flexion bilateral knees with right surgical scar noted.  Full range of motion bilateral ankles.  Hyperpronation, hammertoe deformities, hallux valgus deformities, high insteps bilateral feet.      Skin:     General: Skin is warm and dry.     Neurological:      General: No focal deficit present.      Mental Status: She is alert.             Dragon Dictation software was used to dictate this note. It may contain errors with dictating incorrect words/spelling. Please contact provider directly for any questions.         [1]   Current Outpatient Medications on File Prior to Visit   Medication Sig Dispense Refill    denosumab (PROLIA) 60 mg/mL Inject 60 mg under the skin      ELDERBERRY PO Take by mouth      glucosamine-chondroitin 500-400 MG tablet Take 1 tablet by mouth in the morning.      levothyroxine 50 mcg tablet Take 50 mcg by mouth in the morning.  1    losartan (COZAAR) 100 MG tablet Take by mouth in the morning.      metoprolol succinate (TOPROL-XL) 50 mg 24 hr tablet Take 50 mg by mouth in the " morning.  2    Multiple Vitamin (MULTIVITAMIN) tablet Take 1 tablet by mouth in the morning.      Multiple Vitamins-Minerals (PRESERVISION AREDS 2 PO) Take by mouth in the morning and in the evening.      omeprazole (PriLOSEC) 20 mg delayed release capsule Take 20 mg by mouth in the morning and 20 mg in the evening.  2    rosuvastatin (CRESTOR) 5 mg tablet Take 5 mg by mouth in the morning. [id number].  3    solifenacin (VESICARE) 10 MG tablet TAKE 1 TABLET (10 MG TOTAL) BY MOUTH DAILY 1 TO 2 HOURS BEFORE BED 90 tablet 2    TURMERIC PO Take by mouth      ALPRAZolam (XANAX) 0.5 mg tablet TAKE 1/2-1 TABLET BY MOUTH EVERY 4-6 HOURS AS NEEDED (Patient not taking: Reported on 6/11/2025)  2    aspirin 81 mg chewable tablet Chew 81 mg daily (Patient not taking: Reported on 6/11/2025)      benazepril-hydrochlorthiazide (LOTENSIN HCT) 20-25 MG per tablet benazepril 20 mg-hydrochlorothiazide 25 mg tablet (Patient not taking: Reported on 6/11/2025)      betamethasone dipropionate (DIPROSONE) 0.05 % cream betamethasone dipropionate 0.05 % topical cream (Patient not taking: Reported on 6/11/2025)      betamethasone, augmented, (DIPROLENE-AF) 0.05 % cream APPLY THIN COAT TO AFFECTED AREA TWICE A DAY IN THE MORNING AND IN THE EVENING (Patient not taking: Reported on 6/11/2025)  1    calcium citrate-vitamin D (CITRACAL+D) 315-200 MG-UNIT per tablet Take 1 tablet by mouth daily (Patient not taking: Reported on 6/11/2025)      cephalexin (KEFLEX) 500 mg capsule cephalexin 500 mg capsule (Patient not taking: Reported on 6/11/2025)      cholecalciferol (VITAMIN D3) 1,000 units tablet Take 1,000 Units by mouth daily (Patient not taking: Reported on 6/11/2025)      Cinnamon 500 MG TABS Take by mouth (Patient not taking: Reported on 6/11/2025)      clindamycin (CLEOCIN) 300 MG capsule clindamycin HCl 300 mg capsule (Patient not taking: Reported on 6/11/2025)      co-enzyme Q-10 30 MG capsule Take 30 mg by mouth 3 (three) times a day       denosumab (Prolia) 60 mg/mL  (Patient not taking: Reported on 6/11/2025)      fluticasone (FLONASE) 50 mcg/act nasal spray fluticasone propionate 50 mcg/actuation nasal spray,suspension (Patient not taking: No sig reported)      lisinopril-hydrochlorothiazide (PRINZIDE,ZESTORETIC) 20-25 MG per tablet Take 1 tablet by mouth daily (Patient not taking: Reported on 6/11/2025)  3    Loratadine 10 MG CAPS Take by mouth (Patient not taking: Reported on 6/11/2025)      meloxicam (MOBIC) 15 mg tablet Take 15 mg by mouth daily as needed (Patient not taking: Reported on 6/11/2025)  2    metoprolol tartrate (LOPRESSOR) 50 mg tablet Take 50 mg by mouth 2 (two) times a day (Patient not taking: Reported on 6/11/2025)      naproxen (NAPROSYN) 375 mg tablet naproxen 375 mg tablet (Patient not taking: No sig reported)      pravastatin (PRAVACHOL) 20 mg tablet pravastatin 20 mg tablet (Patient not taking: Reported on 6/11/2025)      quinapril-hydrochlorothiazide (ACCURETIC) 20-25 MG per tablet quinapril 20 mg-hydrochlorothiazide 25 mg tablet (Patient not taking: No sig reported)      simvastatin (ZOCOR) 10 mg tablet simvastatin 10 mg tablet (Patient not taking: No sig reported)      traMADol (ULTRAM) 50 mg tablet tramadol 50 mg tablet (Patient not taking: No sig reported)      vancomycin (VANCOCIN) 125 MG capsule vancomycin 125 mg capsule (Patient not taking: No sig reported)       Current Facility-Administered Medications on File Prior to Visit   Medication Dose Route Frequency Provider Last Rate Last Admin    denosumab (PROLIA) subcutaneous injection 60 mg  60 mg Subcutaneous Once Blanca Brizuela PA-C        denosumab (PROLIA) subcutaneous injection 60 mg  60 mg Subcutaneous Once Blanca Brizuela PA-C

## 2025-06-16 NOTE — PROGRESS NOTES
"Assessment/Plan:    Cherrie Benites came into the [facility] today 06/16/25 to receive Prolia injection.      Verbal consent obtained.  Consent given by: patient    patient states patient has been medically healthy with no underlining concerns/complications.      Cherrie Benites presents with no symptoms today.       All insturctions were reviewed with the patient.    If the patient should have any questions/concerns, advised patient to contacted [facility].       Subjective:     History provided by: patient    Patient ID: Cherrie Benites is a 84 y.o. female      Objective:    Vitals:    06/11/25 1302   BP: 128/74   Pulse: 69   SpO2: 95%   Weight: 94.3 kg (208 lb)   Height: 5' 4.75\" (1.645 m)       Patient tolerated the injection well without any complications.  Injection site/s Left Arm.  Number of Dose N/A  Medication was provided by Office.          Patient signed consent form yes   Patient signed ABN form no (If no patient is not a medicare patient).   Patient waited 15 minutes after injection no (This only applies to patient's receiving first time injection).       Last Visit: Visit date not found  Next visit:Visit date not found     "

## 2025-06-29 DIAGNOSIS — R35.1 NOCTURIA: ICD-10-CM

## 2025-06-30 RX ORDER — SOLIFENACIN SUCCINATE 10 MG/1
10 TABLET, FILM COATED ORAL DAILY
Qty: 90 TABLET | Refills: 1 | Status: SHIPPED | OUTPATIENT
Start: 2025-06-30